# Patient Record
Sex: MALE | Race: WHITE | NOT HISPANIC OR LATINO | URBAN - METROPOLITAN AREA
[De-identification: names, ages, dates, MRNs, and addresses within clinical notes are randomized per-mention and may not be internally consistent; named-entity substitution may affect disease eponyms.]

---

## 2018-07-05 PROBLEM — Z00.00 ENCOUNTER FOR PREVENTIVE HEALTH EXAMINATION: Status: ACTIVE | Noted: 2018-07-05

## 2019-04-02 ENCOUNTER — EMERGENCY (EMERGENCY)
Facility: HOSPITAL | Age: 49
LOS: 1 days | Discharge: HOME | End: 2019-04-02
Attending: EMERGENCY MEDICINE | Admitting: EMERGENCY MEDICINE
Payer: COMMERCIAL

## 2019-04-02 VITALS
RESPIRATION RATE: 18 BRPM | OXYGEN SATURATION: 98 % | TEMPERATURE: 98 F | HEART RATE: 90 BPM | DIASTOLIC BLOOD PRESSURE: 80 MMHG | SYSTOLIC BLOOD PRESSURE: 135 MMHG

## 2019-04-02 VITALS
OXYGEN SATURATION: 96 % | HEIGHT: 67 IN | TEMPERATURE: 98 F | SYSTOLIC BLOOD PRESSURE: 150 MMHG | DIASTOLIC BLOOD PRESSURE: 88 MMHG | RESPIRATION RATE: 16 BRPM | WEIGHT: 250 LBS | HEART RATE: 94 BPM

## 2019-04-02 PROCEDURE — 99283 EMERGENCY DEPT VISIT LOW MDM: CPT

## 2019-04-02 PROCEDURE — 99284 EMERGENCY DEPT VISIT MOD MDM: CPT | Mod: 25

## 2019-04-02 RX ORDER — IBUPROFEN 200 MG
600 TABLET ORAL ONCE
Qty: 0 | Refills: 0 | Status: COMPLETED | OUTPATIENT
Start: 2019-04-02 | End: 2019-04-02

## 2019-04-02 RX ORDER — DIAZEPAM 5 MG
5 TABLET ORAL ONCE
Qty: 0 | Refills: 0 | Status: DISCONTINUED | OUTPATIENT
Start: 2019-04-02 | End: 2019-04-02

## 2019-04-02 RX ORDER — DIAZEPAM 5 MG
1 TABLET ORAL
Qty: 15 | Refills: 0
Start: 2019-04-02 | End: 2019-04-06

## 2019-04-02 RX ORDER — OXYCODONE AND ACETAMINOPHEN 5; 325 MG/1; MG/1
2 TABLET ORAL ONCE
Qty: 0 | Refills: 0 | Status: DISCONTINUED | OUTPATIENT
Start: 2019-04-02 | End: 2019-04-02

## 2019-04-02 RX ADMIN — Medication 600 MILLIGRAM(S): at 01:32

## 2019-04-02 RX ADMIN — OXYCODONE AND ACETAMINOPHEN 2 TABLET(S): 5; 325 TABLET ORAL at 01:32

## 2019-04-02 RX ADMIN — Medication 5 MILLIGRAM(S): at 01:32

## 2019-04-02 NOTE — ED ADULT NURSE NOTE - OBJECTIVE STATEMENT
Received pt awake alert and oriented x 3. DELEON. Pt presents to the ED C/O neck pain. Pt states he went to urgent care earlier and was given flexeril but didn't help.

## 2019-04-02 NOTE — ED ADULT TRIAGE NOTE - CHIEF COMPLAINT QUOTE
pain to the left side of the neck since saturday - now shooting down the left arm, numbness on the left hand starting from the shoulder to the left side of the neck /* 2 hours

## 2019-04-02 NOTE — ED PROVIDER NOTE - PROGRESS NOTE DETAILS
pt reevaluted, feeling better, pain has improved, pt has better range of motion with meds, pt to be d/c home with percocet and valium, follow up with pmd, return if any symtpoms wrosen

## 2019-04-02 NOTE — ED PROVIDER NOTE - OBJECTIVE STATEMENT
48yo male who presents with neck pain for 3 days. pt states he was lifting heavy weights and the next day felt a pull on the left side of his neck, sharp and shooting, no tingling or weakness, pain is worse with moving his neck, pt was seen at urgent care tonite and given flexeril and used tiger balm and heating pad with no improvement

## 2019-04-02 NOTE — ED ADULT NURSE NOTE - NSIMPLEMENTINTERV_GEN_ALL_ED
Implemented All Universal Safety Interventions:  [place] to call system. Call bell, personal items and telephone within reach. Instruct patient to call for assistance. Room bathroom lighting operational. Non-slip footwear when patient is off stretcher. Physically safe environment: no spills, clutter or unnecessary equipment. Stretcher in lowest position, wheels locked, appropriate side rails in place.

## 2019-04-10 PROBLEM — E03.9 HYPOTHYROIDISM, UNSPECIFIED: Chronic | Status: ACTIVE | Noted: 2019-04-02

## 2019-06-13 ENCOUNTER — OUTPATIENT (OUTPATIENT)
Dept: OUTPATIENT SERVICES | Facility: HOSPITAL | Age: 49
LOS: 1 days | End: 2019-06-13
Payer: COMMERCIAL

## 2019-06-13 VITALS
HEART RATE: 81 BPM | RESPIRATION RATE: 16 BRPM | DIASTOLIC BLOOD PRESSURE: 91 MMHG | TEMPERATURE: 98 F | OXYGEN SATURATION: 97 % | HEIGHT: 68 IN | SYSTOLIC BLOOD PRESSURE: 151 MMHG | WEIGHT: 259.93 LBS

## 2019-06-13 DIAGNOSIS — K40.30 UNILATERAL INGUINAL HERNIA, WITH OBSTRUCTION, WITHOUT GANGRENE, NOT SPECIFIED AS RECURRENT: ICD-10-CM

## 2019-06-13 DIAGNOSIS — Z01.818 ENCOUNTER FOR OTHER PREPROCEDURAL EXAMINATION: ICD-10-CM

## 2019-06-13 DIAGNOSIS — Z98.890 OTHER SPECIFIED POSTPROCEDURAL STATES: Chronic | ICD-10-CM

## 2019-06-13 LAB
ALBUMIN SERPL ELPH-MCNC: 3.9 G/DL — SIGNIFICANT CHANGE UP (ref 3.3–5)
ALP SERPL-CCNC: 76 U/L — SIGNIFICANT CHANGE UP (ref 40–120)
ALT FLD-CCNC: 64 U/L — SIGNIFICANT CHANGE UP (ref 12–78)
ANION GAP SERPL CALC-SCNC: 3 MMOL/L — LOW (ref 5–17)
AST SERPL-CCNC: 44 U/L — HIGH (ref 15–37)
BILIRUB SERPL-MCNC: 0.4 MG/DL — SIGNIFICANT CHANGE UP (ref 0.2–1.2)
BUN SERPL-MCNC: 21 MG/DL — SIGNIFICANT CHANGE UP (ref 7–23)
CALCIUM SERPL-MCNC: 9.1 MG/DL — SIGNIFICANT CHANGE UP (ref 8.5–10.1)
CHLORIDE SERPL-SCNC: 110 MMOL/L — HIGH (ref 96–108)
CO2 SERPL-SCNC: 27 MMOL/L — SIGNIFICANT CHANGE UP (ref 22–31)
CREAT SERPL-MCNC: 1.1 MG/DL — SIGNIFICANT CHANGE UP (ref 0.5–1.3)
GLUCOSE SERPL-MCNC: 113 MG/DL — HIGH (ref 70–99)
HCT VFR BLD CALC: 53.9 % — HIGH (ref 39–50)
HGB BLD-MCNC: 18.2 G/DL — HIGH (ref 13–17)
MCHC RBC-ENTMCNC: 29.6 PG — SIGNIFICANT CHANGE UP (ref 27–34)
MCHC RBC-ENTMCNC: 33.8 GM/DL — SIGNIFICANT CHANGE UP (ref 32–36)
MCV RBC AUTO: 87.8 FL — SIGNIFICANT CHANGE UP (ref 80–100)
NRBC # BLD: 0 /100 WBCS — SIGNIFICANT CHANGE UP (ref 0–0)
PLATELET # BLD AUTO: 238 K/UL — SIGNIFICANT CHANGE UP (ref 150–400)
POTASSIUM SERPL-MCNC: 4.5 MMOL/L — SIGNIFICANT CHANGE UP (ref 3.5–5.3)
POTASSIUM SERPL-SCNC: 4.5 MMOL/L — SIGNIFICANT CHANGE UP (ref 3.5–5.3)
PROT SERPL-MCNC: 7.6 G/DL — SIGNIFICANT CHANGE UP (ref 6–8.3)
RBC # BLD: 6.14 M/UL — HIGH (ref 4.2–5.8)
RBC # FLD: 12.6 % — SIGNIFICANT CHANGE UP (ref 10.3–14.5)
SODIUM SERPL-SCNC: 140 MMOL/L — SIGNIFICANT CHANGE UP (ref 135–145)
WBC # BLD: 8.61 K/UL — SIGNIFICANT CHANGE UP (ref 3.8–10.5)
WBC # FLD AUTO: 8.61 K/UL — SIGNIFICANT CHANGE UP (ref 3.8–10.5)

## 2019-06-13 PROCEDURE — 93005 ELECTROCARDIOGRAM TRACING: CPT

## 2019-06-13 PROCEDURE — 36415 COLL VENOUS BLD VENIPUNCTURE: CPT

## 2019-06-13 PROCEDURE — 93010 ELECTROCARDIOGRAM REPORT: CPT | Mod: NC

## 2019-06-13 PROCEDURE — 80053 COMPREHEN METABOLIC PANEL: CPT

## 2019-06-13 PROCEDURE — 85027 COMPLETE CBC AUTOMATED: CPT

## 2019-06-13 PROCEDURE — G0463: CPT

## 2019-06-13 RX ORDER — CYCLOBENZAPRINE HYDROCHLORIDE 10 MG/1
1 TABLET, FILM COATED ORAL
Qty: 0 | Refills: 0 | DISCHARGE

## 2019-06-13 RX ORDER — DEXTROAMPHETAMINE SACCHARATE, AMPHETAMINE ASPARTATE, DEXTROAMPHETAMINE SULFATE AND AMPHETAMINE SULFATE 1.875; 1.875; 1.875; 1.875 MG/1; MG/1; MG/1; MG/1
0 TABLET ORAL
Qty: 0 | Refills: 0 | DISCHARGE

## 2019-06-13 RX ORDER — ATORVASTATIN CALCIUM 80 MG/1
0 TABLET, FILM COATED ORAL
Qty: 0 | Refills: 0 | DISCHARGE

## 2019-06-13 NOTE — H&P PST ADULT - ASSESSMENT
48 yo male with a right inguinal hernia scheduled for a repair of right inguinal hernia on 6/21 with Dr. Sandoval

## 2019-06-13 NOTE — H&P PST ADULT - NSICDXPROBLEM_GEN_ALL_CORE_FT
PROBLEM DIAGNOSES  Problem: Unilateral inguinal hernia with obstruction and without gangrene, recurrence not specified  Assessment and Plan: scheduled for a repair of right inguinal hernia on 6/21 with Dr. Sandoval      Problem: Pre-op evaluation  Assessment and Plan: Labs - CBC, CMP and EKG  No MC requested by Dr. Sandoval  Pre op and Hibiclens instructions reviewed and given. Take routine am meds DOS with sip of water. Avoid NSAIDs and OTC supplements. Verbalized understanding

## 2019-06-13 NOTE — H&P PST ADULT - HISTORY OF PRESENT ILLNESS
48 yo male with ADD, HLD and hypothyroidism presents to [facility] scheduled for a repair of right inguinal hernia on 6/21 with Dr. Sandoval. Reports right groin pain twice in the past 6 months with bulging. Denies urinary symptomology

## 2019-06-13 NOTE — H&P PST ADULT - NSANTHOSAYNRD_GEN_A_CORE
No. AUGUST screening performed.  STOP BANG Legend: 0-2 = LOW Risk; 3-4 = INTERMEDIATE Risk; 5-8 = HIGH Risk/Neg study years ago

## 2019-06-13 NOTE — H&P PST ADULT - ATTEMPT TO OOB
Face to face report given with opportunity to observe patient.  Report given to MACIE Hood.    Salome Knapp  1/19/2019, 11:34 PM         no

## 2019-06-20 ENCOUNTER — TRANSCRIPTION ENCOUNTER (OUTPATIENT)
Age: 49
End: 2019-06-20

## 2019-06-21 ENCOUNTER — OUTPATIENT (OUTPATIENT)
Dept: OUTPATIENT SERVICES | Facility: HOSPITAL | Age: 49
LOS: 1 days | End: 2019-06-21
Payer: COMMERCIAL

## 2019-06-21 ENCOUNTER — RESULT REVIEW (OUTPATIENT)
Age: 49
End: 2019-06-21

## 2019-06-21 VITALS
TEMPERATURE: 99 F | RESPIRATION RATE: 14 BRPM | SYSTOLIC BLOOD PRESSURE: 150 MMHG | WEIGHT: 259.93 LBS | HEART RATE: 82 BPM | OXYGEN SATURATION: 97 % | DIASTOLIC BLOOD PRESSURE: 82 MMHG | HEIGHT: 68 IN

## 2019-06-21 VITALS
RESPIRATION RATE: 15 BRPM | OXYGEN SATURATION: 96 % | SYSTOLIC BLOOD PRESSURE: 136 MMHG | HEART RATE: 92 BPM | DIASTOLIC BLOOD PRESSURE: 71 MMHG

## 2019-06-21 DIAGNOSIS — Z98.890 OTHER SPECIFIED POSTPROCEDURAL STATES: Chronic | ICD-10-CM

## 2019-06-21 DIAGNOSIS — K40.30 UNILATERAL INGUINAL HERNIA, WITH OBSTRUCTION, WITHOUT GANGRENE, NOT SPECIFIED AS RECURRENT: ICD-10-CM

## 2019-06-21 PROCEDURE — C1781: CPT

## 2019-06-21 PROCEDURE — 49505 PRP I/HERN INIT REDUC >5 YR: CPT | Mod: RT

## 2019-06-21 PROCEDURE — 88304 TISSUE EXAM BY PATHOLOGIST: CPT

## 2019-06-21 PROCEDURE — 55520 REMOVAL OF SPERM CORD LESION: CPT | Mod: XS

## 2019-06-21 PROCEDURE — 88304 TISSUE EXAM BY PATHOLOGIST: CPT | Mod: 26

## 2019-06-21 RX ORDER — OXYCODONE HYDROCHLORIDE 5 MG/1
5 TABLET ORAL ONCE
Refills: 0 | Status: DISCONTINUED | OUTPATIENT
Start: 2019-06-21 | End: 2019-06-21

## 2019-06-21 RX ORDER — SODIUM CHLORIDE 9 MG/ML
1000 INJECTION, SOLUTION INTRAVENOUS
Refills: 0 | Status: DISCONTINUED | OUTPATIENT
Start: 2019-06-21 | End: 2019-06-21

## 2019-06-21 RX ORDER — METOCLOPRAMIDE HCL 10 MG
5 TABLET ORAL ONCE
Refills: 0 | Status: DISCONTINUED | OUTPATIENT
Start: 2019-06-21 | End: 2019-06-21

## 2019-06-21 RX ORDER — CEFAZOLIN SODIUM 1 G
1000 VIAL (EA) INJECTION ONCE
Refills: 0 | Status: COMPLETED | OUTPATIENT
Start: 2019-06-21 | End: 2019-06-21

## 2019-06-21 RX ORDER — SODIUM CHLORIDE 9 MG/ML
3 INJECTION INTRAMUSCULAR; INTRAVENOUS; SUBCUTANEOUS ONCE
Refills: 0 | Status: DISCONTINUED | OUTPATIENT
Start: 2019-06-21 | End: 2019-06-21

## 2019-06-21 RX ORDER — HYDROMORPHONE HYDROCHLORIDE 2 MG/ML
0.5 INJECTION INTRAMUSCULAR; INTRAVENOUS; SUBCUTANEOUS
Refills: 0 | Status: DISCONTINUED | OUTPATIENT
Start: 2019-06-21 | End: 2019-06-21

## 2019-06-21 RX ADMIN — SODIUM CHLORIDE 125 MILLILITER(S): 9 INJECTION, SOLUTION INTRAVENOUS at 11:55

## 2019-06-21 RX ADMIN — SODIUM CHLORIDE 30 MILLILITER(S): 9 INJECTION, SOLUTION INTRAVENOUS at 09:10

## 2019-06-21 NOTE — ASU PATIENT PROFILE, ADULT - PMH
Attention deficit disorder    HLD (hyperlipidemia)    Hypothyroidism    Unilateral inguinal hernia with obstruction and without gangrene, recurrence not specified

## 2019-06-21 NOTE — ASU DISCHARGE PLAN (ADULT/PEDIATRIC) - CARE PROVIDER_API CALL
Navin Sandoval ()  Surgery  Navin Sandoval [address] , [place] [address]  Phone: [phone]  Fax: [phone]  Follow Up Time:

## 2019-06-21 NOTE — ASU DISCHARGE PLAN (ADULT/PEDIATRIC) - ASU DC SPECIAL INSTRUCTIONSFT
You may shower in 24 hours.  Do not remove steri-strips.  Do not let water hit wound directly, do not rub incision.      No heavy lifting (nothing heavier than 5 lbs.), no strenuous physical activity, no exercise.      You may perform your usual daily tasks as tolerated.      Take medications as prescribed for pain.  Take Milk of Magnesia (30cc twice a day) while taking narcotic pain medications.     Follow-up with Dr. Sandoval in his office as per office instructions discussed during your pre-operative office consultation.

## 2019-06-24 LAB — SURGICAL PATHOLOGY STUDY: SIGNIFICANT CHANGE UP

## 2019-12-18 PROBLEM — F98.8 OTHER SPECIFIED BEHAVIORAL AND EMOTIONAL DISORDERS WITH ONSET USUALLY OCCURRING IN CHILDHOOD AND ADOLESCENCE: Chronic | Status: ACTIVE | Noted: 2019-06-13

## 2019-12-18 PROBLEM — K40.30 UNILATERAL INGUINAL HERNIA, WITH OBSTRUCTION, WITHOUT GANGRENE, NOT SPECIFIED AS RECURRENT: Chronic | Status: ACTIVE | Noted: 2019-06-13

## 2019-12-18 PROBLEM — E78.5 HYPERLIPIDEMIA, UNSPECIFIED: Chronic | Status: ACTIVE | Noted: 2019-06-13

## 2019-12-24 ENCOUNTER — OUTPATIENT (OUTPATIENT)
Dept: OUTPATIENT SERVICES | Facility: HOSPITAL | Age: 49
LOS: 1 days | End: 2019-12-24
Payer: COMMERCIAL

## 2019-12-24 VITALS
SYSTOLIC BLOOD PRESSURE: 161 MMHG | OXYGEN SATURATION: 97 % | DIASTOLIC BLOOD PRESSURE: 87 MMHG | TEMPERATURE: 98 F | RESPIRATION RATE: 16 BRPM | HEIGHT: 67 IN | HEART RATE: 74 BPM | WEIGHT: 266.98 LBS

## 2019-12-24 DIAGNOSIS — Z01.818 ENCOUNTER FOR OTHER PREPROCEDURAL EXAMINATION: ICD-10-CM

## 2019-12-24 DIAGNOSIS — Z98.890 OTHER SPECIFIED POSTPROCEDURAL STATES: Chronic | ICD-10-CM

## 2019-12-24 DIAGNOSIS — M17.12 UNILATERAL PRIMARY OSTEOARTHRITIS, LEFT KNEE: ICD-10-CM

## 2019-12-24 LAB
ALBUMIN SERPL ELPH-MCNC: 3.5 G/DL — SIGNIFICANT CHANGE UP (ref 3.3–5)
ALP SERPL-CCNC: 66 U/L — SIGNIFICANT CHANGE UP (ref 40–120)
ALT FLD-CCNC: 53 U/L — SIGNIFICANT CHANGE UP (ref 12–78)
ANION GAP SERPL CALC-SCNC: 6 MMOL/L — SIGNIFICANT CHANGE UP (ref 5–17)
APTT BLD: 29.8 SEC — SIGNIFICANT CHANGE UP (ref 28.5–37)
AST SERPL-CCNC: 31 U/L — SIGNIFICANT CHANGE UP (ref 15–37)
BILIRUB SERPL-MCNC: 0.6 MG/DL — SIGNIFICANT CHANGE UP (ref 0.2–1.2)
BUN SERPL-MCNC: 17 MG/DL — SIGNIFICANT CHANGE UP (ref 7–23)
CALCIUM SERPL-MCNC: 8.5 MG/DL — SIGNIFICANT CHANGE UP (ref 8.5–10.1)
CHLORIDE SERPL-SCNC: 111 MMOL/L — HIGH (ref 96–108)
CO2 SERPL-SCNC: 26 MMOL/L — SIGNIFICANT CHANGE UP (ref 22–31)
CREAT SERPL-MCNC: 1.2 MG/DL — SIGNIFICANT CHANGE UP (ref 0.5–1.3)
GLUCOSE SERPL-MCNC: 98 MG/DL — SIGNIFICANT CHANGE UP (ref 70–99)
HBA1C BLD-MCNC: 5.5 % — SIGNIFICANT CHANGE UP (ref 4–5.6)
HCT VFR BLD CALC: 44.9 % — SIGNIFICANT CHANGE UP (ref 39–50)
HGB BLD-MCNC: 15.1 G/DL — SIGNIFICANT CHANGE UP (ref 13–17)
INR BLD: 1 RATIO — SIGNIFICANT CHANGE UP (ref 0.88–1.16)
MCHC RBC-ENTMCNC: 30 PG — SIGNIFICANT CHANGE UP (ref 27–34)
MCHC RBC-ENTMCNC: 33.6 GM/DL — SIGNIFICANT CHANGE UP (ref 32–36)
MCV RBC AUTO: 89.1 FL — SIGNIFICANT CHANGE UP (ref 80–100)
NRBC # BLD: 0 /100 WBCS — SIGNIFICANT CHANGE UP (ref 0–0)
PLATELET # BLD AUTO: 259 K/UL — SIGNIFICANT CHANGE UP (ref 150–400)
POTASSIUM SERPL-MCNC: 3.9 MMOL/L — SIGNIFICANT CHANGE UP (ref 3.5–5.3)
POTASSIUM SERPL-SCNC: 3.9 MMOL/L — SIGNIFICANT CHANGE UP (ref 3.5–5.3)
PROT SERPL-MCNC: 6.6 G/DL — SIGNIFICANT CHANGE UP (ref 6–8.3)
PROTHROM AB SERPL-ACNC: 11.3 SEC — SIGNIFICANT CHANGE UP (ref 10–12.9)
RBC # BLD: 5.04 M/UL — SIGNIFICANT CHANGE UP (ref 4.2–5.8)
RBC # FLD: 13.4 % — SIGNIFICANT CHANGE UP (ref 10.3–14.5)
SODIUM SERPL-SCNC: 143 MMOL/L — SIGNIFICANT CHANGE UP (ref 135–145)
WBC # BLD: 10.86 K/UL — HIGH (ref 3.8–10.5)
WBC # FLD AUTO: 10.86 K/UL — HIGH (ref 3.8–10.5)

## 2019-12-24 PROCEDURE — 93010 ELECTROCARDIOGRAM REPORT: CPT

## 2019-12-24 PROCEDURE — 86900 BLOOD TYPING SEROLOGIC ABO: CPT

## 2019-12-24 PROCEDURE — 86901 BLOOD TYPING SEROLOGIC RH(D): CPT

## 2019-12-24 PROCEDURE — 85730 THROMBOPLASTIN TIME PARTIAL: CPT

## 2019-12-24 PROCEDURE — 80053 COMPREHEN METABOLIC PANEL: CPT

## 2019-12-24 PROCEDURE — 36415 COLL VENOUS BLD VENIPUNCTURE: CPT

## 2019-12-24 PROCEDURE — 93005 ELECTROCARDIOGRAM TRACING: CPT

## 2019-12-24 PROCEDURE — 87640 STAPH A DNA AMP PROBE: CPT

## 2019-12-24 PROCEDURE — 87641 MR-STAPH DNA AMP PROBE: CPT

## 2019-12-24 PROCEDURE — 73560 X-RAY EXAM OF KNEE 1 OR 2: CPT

## 2019-12-24 PROCEDURE — G0463: CPT

## 2019-12-24 PROCEDURE — 85610 PROTHROMBIN TIME: CPT

## 2019-12-24 PROCEDURE — 86850 RBC ANTIBODY SCREEN: CPT

## 2019-12-24 PROCEDURE — 85027 COMPLETE CBC AUTOMATED: CPT

## 2019-12-24 PROCEDURE — 83036 HEMOGLOBIN GLYCOSYLATED A1C: CPT

## 2019-12-24 PROCEDURE — 73560 X-RAY EXAM OF KNEE 1 OR 2: CPT | Mod: 26,50,59

## 2019-12-24 RX ORDER — LISINOPRIL 2.5 MG/1
0 TABLET ORAL
Qty: 30 | Refills: 0 | DISCHARGE

## 2019-12-24 RX ORDER — MELOXICAM 15 MG/1
0 TABLET ORAL
Qty: 30 | Refills: 0 | DISCHARGE

## 2019-12-24 NOTE — H&P PST ADULT - ASSESSMENT
50 yo male with OA of the left knee scheduled for a left total knee replacement on 1/6/2020 with Dr. Partida

## 2019-12-24 NOTE — H&P PST ADULT - HISTORY OF PRESENT ILLNESS
Influenza Vaccination 50 yo male with HTN, ADD, HLD and hypothyroidism presents to PST scheduled for a left total knee replacement on 1/6/2020 with Dr. Partida. Reports bilateral knee pain secondary to bone on bone OA. Left knee hurts more then the right. Doing the left knee first then doing the right knee one week later. Rivaroxaban/Xarelto/Influenza Vaccination

## 2019-12-24 NOTE — H&P PST ADULT - NSICDXPROBLEM_GEN_ALL_CORE_FT
PROBLEM DIAGNOSES  Problem: Unilateral primary osteoarthritis, left knee  Assessment and Plan: scheduled for a left total knee replacement on 1/6/2020 with Dr. Partida    Problem: Pre-op evaluation  Assessment and Plan: Labs-CBC, CMP, PT/PTT, T&S, A1c, Nose cx, and EKG, X-ray  MC   Pre op and 3 day of Hibiclens instructions reviewed and given. Take routine am meds DOS with sip of water. Avoid NSAIDs and OTC supplements. Verbalized understanding PROBLEM DIAGNOSES  Problem: Unilateral primary osteoarthritis, left knee  Assessment and Plan: scheduled for a left total knee replacement on 1/6/2020 with Dr. Partida    Problem: Pre-op evaluation  Assessment and Plan: Labs-CBC, CMP, PT/PTT, T&S, A1c, Nose cx, and EKG, bilateral knee x-ray  MC with Dr. Zavala  Pre op and 3 day of Hibiclens instructions reviewed and given. Take routine am meds DOS with sip of water. Avoid NSAIDs and OTC supplements. Verbalized understanding

## 2019-12-24 NOTE — H&P PST ADULT - NSICDXPASTMEDICALHX_GEN_ALL_CORE_FT
PAST MEDICAL HISTORY:  Attention deficit disorder     HLD (hyperlipidemia)     HTN (hypertension)     Hypothyroidism     Unilateral inguinal hernia with obstruction and without gangrene, recurrence not specified     Unilateral primary osteoarthritis, left knee     Unilateral primary osteoarthritis, unspecified knee

## 2019-12-25 LAB
MRSA PCR RESULT.: SIGNIFICANT CHANGE UP
S AUREUS DNA NOSE QL NAA+PROBE: SIGNIFICANT CHANGE UP

## 2020-01-03 RX ORDER — TRAMADOL HYDROCHLORIDE 50 MG/1
50 TABLET ORAL EVERY 4 HOURS
Refills: 0 | Status: DISCONTINUED | OUTPATIENT
Start: 2020-01-06 | End: 2020-01-13

## 2020-01-03 RX ORDER — HYDROMORPHONE HYDROCHLORIDE 2 MG/ML
2 INJECTION INTRAMUSCULAR; INTRAVENOUS; SUBCUTANEOUS EVERY 4 HOURS
Refills: 0 | Status: DISCONTINUED | OUTPATIENT
Start: 2020-01-06 | End: 2020-01-09

## 2020-01-03 RX ORDER — ACETAMINOPHEN 500 MG
1000 TABLET ORAL EVERY 6 HOURS
Refills: 0 | Status: DISCONTINUED | OUTPATIENT
Start: 2020-01-06 | End: 2020-01-16

## 2020-01-03 RX ORDER — SODIUM CHLORIDE 9 MG/ML
1000 INJECTION, SOLUTION INTRAVENOUS
Refills: 0 | Status: DISCONTINUED | OUTPATIENT
Start: 2020-01-06 | End: 2020-01-07

## 2020-01-03 RX ORDER — PANTOPRAZOLE SODIUM 20 MG/1
40 TABLET, DELAYED RELEASE ORAL
Refills: 0 | Status: DISCONTINUED | OUTPATIENT
Start: 2020-01-06 | End: 2020-01-13

## 2020-01-03 RX ORDER — ONDANSETRON 8 MG/1
4 TABLET, FILM COATED ORAL EVERY 6 HOURS
Refills: 0 | Status: DISCONTINUED | OUTPATIENT
Start: 2020-01-06 | End: 2020-01-13

## 2020-01-03 RX ORDER — ATORVASTATIN CALCIUM 80 MG/1
40 TABLET, FILM COATED ORAL AT BEDTIME
Refills: 0 | Status: DISCONTINUED | OUTPATIENT
Start: 2020-01-06 | End: 2020-01-13

## 2020-01-03 RX ORDER — FERROUS SULFATE 325(65) MG
325 TABLET ORAL
Refills: 0 | Status: DISCONTINUED | OUTPATIENT
Start: 2020-01-06 | End: 2020-01-13

## 2020-01-03 RX ORDER — FOLIC ACID 0.8 MG
1 TABLET ORAL DAILY
Refills: 0 | Status: DISCONTINUED | OUTPATIENT
Start: 2020-01-06 | End: 2020-01-13

## 2020-01-03 RX ORDER — CELECOXIB 200 MG/1
200 CAPSULE ORAL
Refills: 0 | Status: DISCONTINUED | OUTPATIENT
Start: 2020-01-06 | End: 2020-01-13

## 2020-01-03 RX ORDER — LISINOPRIL 2.5 MG/1
20 TABLET ORAL DAILY
Refills: 0 | Status: DISCONTINUED | OUTPATIENT
Start: 2020-01-06 | End: 2020-01-13

## 2020-01-03 RX ORDER — LEVOTHYROXINE SODIUM 125 MCG
175 TABLET ORAL DAILY
Refills: 0 | Status: DISCONTINUED | OUTPATIENT
Start: 2020-01-06 | End: 2020-01-13

## 2020-01-03 RX ORDER — DEXTROAMPHETAMINE SACCHARATE, AMPHETAMINE ASPARTATE, DEXTROAMPHETAMINE SULFATE AND AMPHETAMINE SULFATE 1.875; 1.875; 1.875; 1.875 MG/1; MG/1; MG/1; MG/1
20 TABLET ORAL DAILY
Refills: 0 | Status: DISCONTINUED | OUTPATIENT
Start: 2020-01-06 | End: 2020-01-13

## 2020-01-03 RX ORDER — TRAMADOL HYDROCHLORIDE 50 MG/1
100 TABLET ORAL EVERY 8 HOURS
Refills: 0 | Status: DISCONTINUED | OUTPATIENT
Start: 2020-01-06 | End: 2020-01-07

## 2020-01-03 RX ORDER — ENOXAPARIN SODIUM 100 MG/ML
40 INJECTION SUBCUTANEOUS DAILY
Refills: 0 | Status: COMPLETED | OUTPATIENT
Start: 2020-01-07 | End: 2020-01-12

## 2020-01-03 RX ORDER — ASCORBIC ACID 60 MG
500 TABLET,CHEWABLE ORAL
Refills: 0 | Status: DISCONTINUED | OUTPATIENT
Start: 2020-01-06 | End: 2020-01-13

## 2020-01-03 RX ORDER — SENNA PLUS 8.6 MG/1
2 TABLET ORAL AT BEDTIME
Refills: 0 | Status: DISCONTINUED | OUTPATIENT
Start: 2020-01-06 | End: 2020-01-13

## 2020-01-05 ENCOUNTER — TRANSCRIPTION ENCOUNTER (OUTPATIENT)
Age: 50
End: 2020-01-05

## 2020-01-06 ENCOUNTER — TRANSCRIPTION ENCOUNTER (OUTPATIENT)
Age: 50
End: 2020-01-06

## 2020-01-06 ENCOUNTER — RESULT REVIEW (OUTPATIENT)
Age: 50
End: 2020-01-06

## 2020-01-06 ENCOUNTER — INPATIENT (INPATIENT)
Facility: HOSPITAL | Age: 50
LOS: 9 days | Discharge: HOME | DRG: 462 | End: 2020-01-16
Attending: ORTHOPAEDIC SURGERY | Admitting: ORTHOPAEDIC SURGERY
Payer: COMMERCIAL

## 2020-01-06 VITALS
HEART RATE: 82 BPM | WEIGHT: 261.91 LBS | OXYGEN SATURATION: 96 % | DIASTOLIC BLOOD PRESSURE: 75 MMHG | RESPIRATION RATE: 18 BRPM | TEMPERATURE: 99 F | HEIGHT: 67.01 IN | SYSTOLIC BLOOD PRESSURE: 137 MMHG

## 2020-01-06 DIAGNOSIS — E78.5 HYPERLIPIDEMIA, UNSPECIFIED: ICD-10-CM

## 2020-01-06 DIAGNOSIS — I10 ESSENTIAL (PRIMARY) HYPERTENSION: ICD-10-CM

## 2020-01-06 DIAGNOSIS — Z98.890 OTHER SPECIFIED POSTPROCEDURAL STATES: Chronic | ICD-10-CM

## 2020-01-06 DIAGNOSIS — M17.12 UNILATERAL PRIMARY OSTEOARTHRITIS, LEFT KNEE: ICD-10-CM

## 2020-01-06 DIAGNOSIS — E03.9 HYPOTHYROIDISM, UNSPECIFIED: ICD-10-CM

## 2020-01-06 LAB
ABO RH CONFIRMATION: SIGNIFICANT CHANGE UP
HCT VFR BLD CALC: 45.8 % — SIGNIFICANT CHANGE UP (ref 39–50)
HGB BLD-MCNC: 15.4 G/DL — SIGNIFICANT CHANGE UP (ref 13–17)
MCHC RBC-ENTMCNC: 30.1 PG — SIGNIFICANT CHANGE UP (ref 27–34)
MCHC RBC-ENTMCNC: 33.6 GM/DL — SIGNIFICANT CHANGE UP (ref 32–36)
MCV RBC AUTO: 89.6 FL — SIGNIFICANT CHANGE UP (ref 80–100)
NRBC # BLD: 0 /100 WBCS — SIGNIFICANT CHANGE UP (ref 0–0)
PLATELET # BLD AUTO: 221 K/UL — SIGNIFICANT CHANGE UP (ref 150–400)
RBC # BLD: 5.11 M/UL — SIGNIFICANT CHANGE UP (ref 4.2–5.8)
RBC # FLD: 12.9 % — SIGNIFICANT CHANGE UP (ref 10.3–14.5)
WBC # BLD: 14.28 K/UL — HIGH (ref 3.8–10.5)
WBC # FLD AUTO: 14.28 K/UL — HIGH (ref 3.8–10.5)

## 2020-01-06 PROCEDURE — 73562 X-RAY EXAM OF KNEE 3: CPT | Mod: 26,LT

## 2020-01-06 PROCEDURE — 88311 DECALCIFY TISSUE: CPT | Mod: 26

## 2020-01-06 PROCEDURE — 88305 TISSUE EXAM BY PATHOLOGIST: CPT | Mod: 26

## 2020-01-06 RX ORDER — ONDANSETRON 8 MG/1
4 TABLET, FILM COATED ORAL ONCE
Refills: 0 | Status: DISCONTINUED | OUTPATIENT
Start: 2020-01-06 | End: 2020-01-13

## 2020-01-06 RX ORDER — CEFAZOLIN SODIUM 1 G
3000 VIAL (EA) INJECTION EVERY 8 HOURS
Refills: 0 | Status: COMPLETED | OUTPATIENT
Start: 2020-01-06 | End: 2020-01-06

## 2020-01-06 RX ORDER — BENZOCAINE AND MENTHOL 5; 1 G/100ML; G/100ML
1 LIQUID ORAL EVERY 4 HOURS
Refills: 0 | Status: DISCONTINUED | OUTPATIENT
Start: 2020-01-06 | End: 2020-01-13

## 2020-01-06 RX ORDER — ENOXAPARIN SODIUM 100 MG/ML
40 INJECTION SUBCUTANEOUS
Qty: 1 | Refills: 0
Start: 2020-01-06 | End: 2020-02-04

## 2020-01-06 RX ORDER — SODIUM CHLORIDE 9 MG/ML
1000 INJECTION, SOLUTION INTRAVENOUS
Refills: 0 | Status: DISCONTINUED | OUTPATIENT
Start: 2020-01-06 | End: 2020-01-06

## 2020-01-06 RX ORDER — ACETAMINOPHEN 500 MG
1000 TABLET ORAL ONCE
Refills: 0 | Status: COMPLETED | OUTPATIENT
Start: 2020-01-06 | End: 2020-01-06

## 2020-01-06 RX ORDER — ACETAMINOPHEN 500 MG
1000 TABLET ORAL ONCE
Refills: 0 | Status: COMPLETED | OUTPATIENT
Start: 2020-01-07 | End: 2020-01-07

## 2020-01-06 RX ORDER — BUPIVACAINE 13.3 MG/ML
20 INJECTION, SUSPENSION, LIPOSOMAL INFILTRATION ONCE
Refills: 0 | Status: DISCONTINUED | OUTPATIENT
Start: 2020-01-06 | End: 2020-01-06

## 2020-01-06 RX ORDER — CELECOXIB 200 MG/1
1 CAPSULE ORAL
Qty: 0 | Refills: 0 | DISCHARGE
Start: 2020-01-06

## 2020-01-06 RX ORDER — BENZOCAINE 10 %
1 GEL (GRAM) MUCOUS MEMBRANE ONCE
Refills: 0 | Status: COMPLETED | OUTPATIENT
Start: 2020-01-06 | End: 2020-01-06

## 2020-01-06 RX ORDER — CEFAZOLIN SODIUM 1 G
3000 VIAL (EA) INJECTION ONCE
Refills: 0 | Status: COMPLETED | OUTPATIENT
Start: 2020-01-06 | End: 2020-01-06

## 2020-01-06 RX ORDER — MELOXICAM 15 MG/1
1 TABLET ORAL
Qty: 0 | Refills: 0 | DISCHARGE

## 2020-01-06 RX ORDER — HYDROMORPHONE HYDROCHLORIDE 2 MG/ML
0.5 INJECTION INTRAMUSCULAR; INTRAVENOUS; SUBCUTANEOUS
Refills: 0 | Status: DISCONTINUED | OUTPATIENT
Start: 2020-01-06 | End: 2020-01-06

## 2020-01-06 RX ORDER — TRAMADOL HYDROCHLORIDE 50 MG/1
1 TABLET ORAL
Qty: 42 | Refills: 0
Start: 2020-01-06 | End: 2020-01-12

## 2020-01-06 RX ADMIN — Medication 400 MILLIGRAM(S): at 09:59

## 2020-01-06 RX ADMIN — HYDROMORPHONE HYDROCHLORIDE 0.5 MILLIGRAM(S): 2 INJECTION INTRAMUSCULAR; INTRAVENOUS; SUBCUTANEOUS at 10:31

## 2020-01-06 RX ADMIN — BENZOCAINE AND MENTHOL 1 LOZENGE: 5; 1 LIQUID ORAL at 18:13

## 2020-01-06 RX ADMIN — Medication 200 MILLIGRAM(S): at 16:05

## 2020-01-06 RX ADMIN — ATORVASTATIN CALCIUM 40 MILLIGRAM(S): 80 TABLET, FILM COATED ORAL at 21:04

## 2020-01-06 RX ADMIN — HYDROMORPHONE HYDROCHLORIDE 2 MILLIGRAM(S): 2 INJECTION INTRAMUSCULAR; INTRAVENOUS; SUBCUTANEOUS at 14:18

## 2020-01-06 RX ADMIN — CELECOXIB 200 MILLIGRAM(S): 200 CAPSULE ORAL at 17:55

## 2020-01-06 RX ADMIN — Medication 1000 MILLIGRAM(S): at 10:28

## 2020-01-06 RX ADMIN — SODIUM CHLORIDE 75 MILLILITER(S): 9 INJECTION, SOLUTION INTRAVENOUS at 07:21

## 2020-01-06 RX ADMIN — HYDROMORPHONE HYDROCHLORIDE 2 MILLIGRAM(S): 2 INJECTION INTRAMUSCULAR; INTRAVENOUS; SUBCUTANEOUS at 17:55

## 2020-01-06 RX ADMIN — HYDROMORPHONE HYDROCHLORIDE 2 MILLIGRAM(S): 2 INJECTION INTRAMUSCULAR; INTRAVENOUS; SUBCUTANEOUS at 13:18

## 2020-01-06 RX ADMIN — SODIUM CHLORIDE 50 MILLILITER(S): 9 INJECTION, SOLUTION INTRAVENOUS at 09:59

## 2020-01-06 RX ADMIN — Medication 400 MILLIGRAM(S): at 17:54

## 2020-01-06 RX ADMIN — TRAMADOL HYDROCHLORIDE 100 MILLIGRAM(S): 50 TABLET ORAL at 23:40

## 2020-01-06 RX ADMIN — Medication 200 MILLIGRAM(S): at 23:41

## 2020-01-06 NOTE — PATIENT PROFILE ADULT - PRIMARY ROLES/RESPONSIBILITIES
Received fax from pt mom requesting asthma action plan form completed for pt school. Mom would like form to be mailed to home.   Last px 2-26-18 MTH  [facility] OF THE [facility] retired

## 2020-01-06 NOTE — PHYSICAL THERAPY INITIAL EVALUATION ADULT - ADDITIONAL COMMENTS
Pt reports that he lives in a two-family home. He and his significant other (Ena, present at bedside) live on the top floor. Pt's two sons live on the ground level. He reports 2 [address] with railings, and one flight of stairs to his apartment with no railings. There is a bedroom, bathroom, and kitchen upstairs. He states that he can stay with his sons if he has to, but would prefer to be able to get up to his own apartment. He states that he was independent in dressing and bathing, and does not own any assistive device. He has a walk-in shower with a grab bar as well as a bathtub. He wants to be able to go home after his second scheduled surgery next week (R TKR on 1/13/2020).

## 2020-01-06 NOTE — DISCHARGE NOTE PROVIDER - NSDCFUADDINST_GEN_ALL_CORE_FT
WEIGHT BEARING AS TOLERATED.  XARELTO 10 MG DAILY FOR TOTAL OF 35 DAYS AS OF 01/14/2020.  FOLLOW UP WITH DR. PUENTE IN 2 WEEKS  CALL [phone] FOR AN APPOINTMENT.  KEEP KNEE AQUACEL  DRESSING  ON DRY AND CLEAN, IT WILL BE CHANGED OR REMOVED ON YOUR NEXT OFFICE VISIT.

## 2020-01-06 NOTE — DISCHARGE NOTE PROVIDER - NSDCMRMEDTOKEN_GEN_ALL_CORE_FT
Adderall 20 mg oral tablet: 1 tab(s) orally once a day  atorvastatin 40 mg oral tablet: 1 tab(s) orally once a day (in the evening)  celecoxib 200 mg oral capsule: 1 cap(s) orally 2 times a day  enoxaparin 40 mg/0.4 mL injectable solution: 40 milligram(s) subcutaneously once a day   Do not Skip doses. Take for 14 days  levothyroxine 175 mcg (0.175 mg) oral tablet: 1 tab(s) orally once a day  LISINOPRIL   TAB 20MG: orally once a day (in the evening)  traMADol 50 mg oral tablet: 1 tab(s) orally every 4 to 6 hours MDD:6 Adderall 20 mg oral tablet: 1 tab(s) orally once a day  atorvastatin 40 mg oral tablet: 1 tab(s) orally once a day (in the evening)  celecoxib 200 mg oral capsule: 1 cap(s) orally 2 times a day  levothyroxine 175 mcg (0.175 mg) oral tablet: 1 tab(s) orally once a day  LISINOPRIL   TAB 20MG: orally once a day (in the evening)  traMADol 50 mg oral tablet: 1 tab(s) orally every 4 to 6 hours MDD:6  Xarelto 10 mg oral tablet: 1 tab(s) orally once a day Adderall 20 mg oral tablet: 1 tab(s) orally once a day  atorvastatin 40 mg oral tablet: 1 tab(s) orally once a day (in the evening)  celecoxib 200 mg oral capsule: 1 cap(s) orally 2 times a day  celecoxib 200 mg oral capsule: 1 cap(s) orally 2 times a day  Colace 100 mg oral capsule: 1 cap(s) orally 2 times a day   ferrous sulfate 325 mg (65 mg elemental iron) oral tablet: 1 tab(s) orally 2 times a day   levothyroxine 175 mcg (0.175 mg) oral tablet: 1 tab(s) orally once a day  LISINOPRIL   TAB 20MG: orally once a day (in the evening)  rivaroxaban 10 mg oral tablet: 1 tab(s) orally once a day  traMADol 50 mg oral tablet: 1 tab(s) orally every 6 hours, As Needed -for severe pain MDD:4  traMADol 50 mg oral tablet: 1 tab(s) orally every 4 to 6 hours MDD:6  Xarelto 10 mg oral tablet: 1 tab(s) orally once a day atorvastatin 40 mg oral tablet: 1 tab(s) orally once a day (at bedtime)  celecoxib 200 mg oral capsule: 1 cap(s) orally 2 times a day  celecoxib 200 mg oral capsule: 1 cap(s) orally 2 times a day  Colace 100 mg oral capsule: 1 cap(s) orally 2 times a day   dextroamphetamine-amphetamine 20 mg oral tablet: 1 tab(s) orally once a day  ferrous sulfate 325 mg (65 mg elemental iron) oral tablet: 1 tab(s) orally 2 times a day   levothyroxine 175 mcg (0.175 mg) oral tablet: 1 tab(s) orally once a day  lisinopril 20 mg oral tablet: 1 tab(s) orally once a day  rivaroxaban 10 mg oral tablet: 1 tab(s) orally once a day  traMADol 50 mg oral tablet: 1 tab(s) orally every 6 hours, As Needed -for severe pain MDD:4  traMADol 50 mg oral tablet: 1 tab(s) orally every 4 to 6 hours MDD:6  Xarelto 10 mg oral tablet: 1 tab(s) orally once a day

## 2020-01-06 NOTE — DISCHARGE NOTE PROVIDER - CARE PROVIDER_API CALL
Steven Partida)  Orthopaedic Surgery  [address] [place] [address]  Phone: [phone]  Fax: [phone]  Follow Up Time: 2 weeks

## 2020-01-06 NOTE — PHYSICAL THERAPY INITIAL EVALUATION ADULT - GAIT DEVIATIONS NOTED, PT EVAL
decreased jessy/increased time in double stance/decreased velocity of limb motion/decreased step length/decreased stride length/decreased weight-shifting ability

## 2020-01-06 NOTE — DISCHARGE NOTE PROVIDER - REASON FOR ADMISSION
left total knee arthroplasty BILATERAL KNEE END STAGE OSTEOARTHRITIS  STAGED BILATERAL TOTAL KNEE REPLACEMENT

## 2020-01-06 NOTE — PROGRESS NOTE ADULT - SUBJECTIVE AND OBJECTIVE BOX
Orthopedics Post-op Check    Patient seen and examined at bedside, resting comfortably. Pain adequately controlled. Patient feeling well. No nausea or vomiting. No other acute complaints at this time    Vital Signs Last 24 Hrs  T(C): 36.7 (01-06-20 @ 15:48), Max: 37 (01-06-20 @ 06:36)  T(F): 98 (01-06-20 @ 15:48), Max: 98.6 (01-06-20 @ 06:36)  HR: 86 (01-06-20 @ 15:48) (69 - 94)  BP: 145/61 (01-06-20 @ 15:48) (98/53 - 145/61)  BP(mean): --  RR: 17 (01-06-20 @ 15:48) (13 - 18)  SpO2: 93% (01-06-20 @ 15:48) (93% - 100%)                        15.4   14.28 )-----------( 221      ( 06 Jan 2020 10:47 )             45.8     Exam:  Gen: NAD, Awake and alert  LLE:  Dressing clean and dry  +EHL/FHL/TA/GS  SILT L2-S1  +DP  Calf Soft NT  Compartments soft and compressible

## 2020-01-06 NOTE — PHYSICAL THERAPY INITIAL EVALUATION ADULT - PERTINENT HX OF CURRENT PROBLEM, REHAB EVAL
48 yo male with HTN, ADD, HLD and hypothyroidism reports bilateral knee pain secondary to bone on bone OA (left knee hurts more than right). Currently presents with L TKR (1/6/2020).  Scheduled R TKR next week (1/13/2020).

## 2020-01-06 NOTE — DISCHARGE NOTE PROVIDER - HOSPITAL COURSE
The patient is a 49year old Malestatus post elective Total Knee Arthroplasty to the left knee after failing outpatient non-operative conservative management.  Patient presented to [facility] after being medically cleared for an elective surgical procedure. The patient was taken to the operating room on date mentioned above. Prophylactic antibiotics were started before the procedure and continued for 24 hours.  There were no complications during the procedure and patient tolerated the procedure well.  The patient was transferred to recovery room in stable condition and subsequently to surgical floor.  Patient was placed on Lovenox for anticoagulation.  All home medications were continued.  The patient received physical therapy daily and daily labs were followed.  The dressing was kept clean, dry, intact.  The rest of the hospital stay was unremarkable. The patient is now stable for discharge. THIS IS A CASE OF A 50 YO MALE EVALUATED IN THE OFFICE FOR BILATERAL KNEE PAIN WITH SEVERE ENDSTAGE OSTEOARTHRITIS.         PAST MEDICAL & SURGICAL HISTORY:        Unilateral primary osteoarthritis, left knee (M17.12)    Unilateral primary osteoarthritis, unspecified knee (M17.10)    HTN (hypertension) (I10)    Attention deficit disorder (F98.8)    Unilateral inguinal hernia with obstruction and without gangrene, recurrence not specified (K40.30)    Disorder of attention or motor control (F90.9)    HLD (hyperlipidemia) (E78.5)    Hypothyroidism (E03.9)    H/O right knee surgery (Z98.890): &lt; 3 years ago    H/O left knee surgery (Z98.890): w/in the year        Home Medications:        Adderall 20 mg oral tablet: 1 tab(s) orally once a day (06 Jan 2020 08:12)    atorvastatin 40 mg oral tablet: 1 tab(s) orally once a day (in the evening) (06 Jan 2020 08:12)    celecoxib 200 mg oral capsule: 1 cap(s) orally 2 times a day (06 Jan 2020 16:15)    levothyroxine 175 mcg (0.175 mg) oral tablet: 1 tab(s) orally once a day (06 Jan 2020 08:12)    LISINOPRIL   TAB 20MG: orally once a day (in the evening) (06 Jan 2020 08:12)        Allergies        No Known Allergies            HOSPITAL COURSE: AFTER THE RISK AND BENEFITS OF SURGICAL INTERVENTION IN DETAILS WERE DISCUSSED WITH THE PATIENT, A CONSENT WAS OBTAINED. AFTER OBTAINING MEDICAL CLEARANCE AND PREOPERATIVE EVALUATION, THE PATIENT WAS TAKEN TO THE OPERATING ROOM ON 01/06/2020 AND THE PATIENT UNDERWENT A   LEFT TOTAL KNEE REPLACEMENT.        POSTOPERATIVE PHASE, THE PATIENT WAS ANTICOAGULATED WITH LOVENOX AND WAS GIVEN 24 HOURS OF IV ANTIBIOTICS. A PHYSICAL THERAPY CONSULT WAS OBTAINED FOR  WEIGHT BEARING AS TOLERATED.   DUE TO ANEMIA OF ACUTE BLOOD LOSS POST OP  IRON SUPPLEMENT GIVEN.         THE PATIENT WAS TAKEN TO THE OPERATING ROOM ON 01/13/2020 FOR  A  RIGHT TOTAL KNEE REPLACEMENT. POSTOPERATIVE PHASE, THE PATIENT WAS ANTICOAGULATED WITH XARELTO AND WAS GIVEN 24 HOURS OF IV ANTIBIOTICS. A SOCIAL SERVICE CONSULT WAS OBTAINED FOR DISCHARGE PLANNING. A PHYSICAL THERAPY CONSULT WAS OBTAINED FOR  WEIGHT BEARING AS TOLERATED.   DUE TO ANEMIA OF ACUTE BLOOD LOSS POST OP  IRON SUPPLEMENT GIVEN.         DISPOSITION : HOME WITH HOME CARE. WEIGHT BEARING AS TOLERATED. XARELTO 10 MG DAILY FOR TOTAL OF 35 DAYS AS OF 01/14/2020. FOLLOW UP WITH DR. PUENTE  IN 2 WEEKS. KEEP KNEE AQUACEL  DRESSING  ON DRY AND CLEAN, IT WILL BE CHANGED OR REMOVED ON  NEXT OFFICE VISIT.

## 2020-01-06 NOTE — PHYSICAL THERAPY INITIAL EVALUATION ADULT - GENERAL OBSERVATIONS, REHAB EVAL
Pt rec'd in semi-fowlers position with B SCDs, IV on L UE, and dressing over incision site on L knee. His significant other, Ena, is present at [facility]. He is agreeable to PT.

## 2020-01-06 NOTE — PROGRESS NOTE ADULT - ASSESSMENT
A/P:  Patient is a 49y Male s/p L TKA Stable POD 0      -Ice/Elevate  -Incentive Spirometry  -Multimodal Analgesia   -Ppx ABX  -DVT PE ppx - starting pod1  -SCDs  -PT/OT OOB  -WBAT  -Discharge planning - pending PT eval  -Will discuss w/ attending and advise if plan changes

## 2020-01-06 NOTE — DISCHARGE NOTE PROVIDER - NSDCHHNEEDSERVICE_GEN_ALL_CORE
Wound care and assessment/Observation and assessment/Rehabilitation services Rehabilitation services/Observation and assessment

## 2020-01-06 NOTE — DISCHARGE NOTE PROVIDER - NSDCACTIVITY_GEN_ALL_CORE
Showering allowed/Walking - Outdoors allowed/Sex allowed/Stairs allowed/Walking - Indoors allowed Showering allowed/Walking - Outdoors allowed/Stairs allowed/Walking - Indoors allowed

## 2020-01-07 LAB
ANION GAP SERPL CALC-SCNC: 8 MMOL/L — SIGNIFICANT CHANGE UP (ref 5–17)
BUN SERPL-MCNC: 20 MG/DL — SIGNIFICANT CHANGE UP (ref 7–23)
CALCIUM SERPL-MCNC: 8.3 MG/DL — LOW (ref 8.5–10.1)
CHLORIDE SERPL-SCNC: 107 MMOL/L — SIGNIFICANT CHANGE UP (ref 96–108)
CO2 SERPL-SCNC: 25 MMOL/L — SIGNIFICANT CHANGE UP (ref 22–31)
CREAT SERPL-MCNC: 1 MG/DL — SIGNIFICANT CHANGE UP (ref 0.5–1.3)
GLUCOSE SERPL-MCNC: 135 MG/DL — HIGH (ref 70–99)
HCT VFR BLD CALC: 40.1 % — SIGNIFICANT CHANGE UP (ref 39–50)
HGB BLD-MCNC: 13.6 G/DL — SIGNIFICANT CHANGE UP (ref 13–17)
MCHC RBC-ENTMCNC: 29.8 PG — SIGNIFICANT CHANGE UP (ref 27–34)
MCHC RBC-ENTMCNC: 33.9 GM/DL — SIGNIFICANT CHANGE UP (ref 32–36)
MCV RBC AUTO: 87.9 FL — SIGNIFICANT CHANGE UP (ref 80–100)
NRBC # BLD: 0 /100 WBCS — SIGNIFICANT CHANGE UP (ref 0–0)
PLATELET # BLD AUTO: 236 K/UL — SIGNIFICANT CHANGE UP (ref 150–400)
POTASSIUM SERPL-MCNC: 4.2 MMOL/L — SIGNIFICANT CHANGE UP (ref 3.5–5.3)
POTASSIUM SERPL-SCNC: 4.2 MMOL/L — SIGNIFICANT CHANGE UP (ref 3.5–5.3)
RBC # BLD: 4.56 M/UL — SIGNIFICANT CHANGE UP (ref 4.2–5.8)
RBC # FLD: 12.9 % — SIGNIFICANT CHANGE UP (ref 10.3–14.5)
SODIUM SERPL-SCNC: 140 MMOL/L — SIGNIFICANT CHANGE UP (ref 135–145)
WBC # BLD: 17.27 K/UL — HIGH (ref 3.8–10.5)
WBC # FLD AUTO: 17.27 K/UL — HIGH (ref 3.8–10.5)

## 2020-01-07 RX ORDER — TRAMADOL HYDROCHLORIDE 50 MG/1
100 TABLET ORAL EVERY 6 HOURS
Refills: 0 | Status: DISCONTINUED | OUTPATIENT
Start: 2020-01-07 | End: 2020-01-13

## 2020-01-07 RX ORDER — RIVAROXABAN 15 MG-20MG
1 KIT ORAL
Qty: 12 | Refills: 0
Start: 2020-01-07 | End: 2020-01-18

## 2020-01-07 RX ORDER — BENZOCAINE 10 %
1 GEL (GRAM) MUCOUS MEMBRANE EVERY 6 HOURS
Refills: 0 | Status: DISCONTINUED | OUTPATIENT
Start: 2020-01-07 | End: 2020-01-13

## 2020-01-07 RX ADMIN — CELECOXIB 200 MILLIGRAM(S): 200 CAPSULE ORAL at 05:40

## 2020-01-07 RX ADMIN — TRAMADOL HYDROCHLORIDE 100 MILLIGRAM(S): 50 TABLET ORAL at 09:00

## 2020-01-07 RX ADMIN — Medication 1 SPRAY(S): at 17:27

## 2020-01-07 RX ADMIN — CELECOXIB 200 MILLIGRAM(S): 200 CAPSULE ORAL at 18:30

## 2020-01-07 RX ADMIN — Medication 1 SPRAY(S): at 11:15

## 2020-01-07 RX ADMIN — Medication 500 MILLIGRAM(S): at 11:08

## 2020-01-07 RX ADMIN — LISINOPRIL 20 MILLIGRAM(S): 2.5 TABLET ORAL at 05:40

## 2020-01-07 RX ADMIN — Medication 325 MILLIGRAM(S): at 17:27

## 2020-01-07 RX ADMIN — TRAMADOL HYDROCHLORIDE 100 MILLIGRAM(S): 50 TABLET ORAL at 20:57

## 2020-01-07 RX ADMIN — Medication 325 MILLIGRAM(S): at 11:16

## 2020-01-07 RX ADMIN — Medication 1 MILLIGRAM(S): at 11:16

## 2020-01-07 RX ADMIN — Medication 400 MILLIGRAM(S): at 02:08

## 2020-01-07 RX ADMIN — CELECOXIB 200 MILLIGRAM(S): 200 CAPSULE ORAL at 06:30

## 2020-01-07 RX ADMIN — TRAMADOL HYDROCHLORIDE 100 MILLIGRAM(S): 50 TABLET ORAL at 01:08

## 2020-01-07 RX ADMIN — TRAMADOL HYDROCHLORIDE 100 MILLIGRAM(S): 50 TABLET ORAL at 14:21

## 2020-01-07 RX ADMIN — PANTOPRAZOLE SODIUM 40 MILLIGRAM(S): 20 TABLET, DELAYED RELEASE ORAL at 05:40

## 2020-01-07 RX ADMIN — Medication 175 MICROGRAM(S): at 05:39

## 2020-01-07 RX ADMIN — ENOXAPARIN SODIUM 40 MILLIGRAM(S): 100 INJECTION SUBCUTANEOUS at 11:15

## 2020-01-07 RX ADMIN — TRAMADOL HYDROCHLORIDE 100 MILLIGRAM(S): 50 TABLET ORAL at 15:20

## 2020-01-07 RX ADMIN — Medication 325 MILLIGRAM(S): at 08:08

## 2020-01-07 RX ADMIN — Medication 1 SPRAY(S): at 01:04

## 2020-01-07 RX ADMIN — TRAMADOL HYDROCHLORIDE 100 MILLIGRAM(S): 50 TABLET ORAL at 21:55

## 2020-01-07 RX ADMIN — Medication 500 MILLIGRAM(S): at 08:07

## 2020-01-07 RX ADMIN — Medication 1 TABLET(S): at 11:16

## 2020-01-07 RX ADMIN — ATORVASTATIN CALCIUM 40 MILLIGRAM(S): 80 TABLET, FILM COATED ORAL at 20:57

## 2020-01-07 RX ADMIN — Medication 500 MILLIGRAM(S): at 17:27

## 2020-01-07 RX ADMIN — TRAMADOL HYDROCHLORIDE 100 MILLIGRAM(S): 50 TABLET ORAL at 08:08

## 2020-01-07 RX ADMIN — CELECOXIB 200 MILLIGRAM(S): 200 CAPSULE ORAL at 17:27

## 2020-01-07 RX ADMIN — Medication 1000 MILLIGRAM(S): at 02:30

## 2020-01-07 NOTE — OCCUPATIONAL THERAPY INITIAL EVALUATION ADULT - PERTINENT HX OF CURRENT PROBLEM, REHAB EVAL
48 y/o male s/p Stage I Left TKR on 1/6/20 by Dr. Partida. Pt is scheduled for Stage II Right TKR on 1/13/20.

## 2020-01-07 NOTE — OCCUPATIONAL THERAPY INITIAL EVALUATION ADULT - GENERAL OBSERVATIONS, REHAB EVAL
Pt found supine in bed with +IV, +SCD, +bandage, supplemental 02 Pt found supine in bed with +IV lock, +SCD, +bandage left knee and +telemonitor.

## 2020-01-07 NOTE — PROGRESS NOTE ADULT - SUBJECTIVE AND OBJECTIVE BOX
Patient is a 49y old  Male who presents with a chief complaint of left total knee arthroplasty (06 Jan 2020 16:10)  feels well without complaints    INTERVAL HPI/OVERNIGHT EVENTS:  T(C): 36.4 (01-08-20 @ 11:47), Max: 36.6 (01-07-20 @ 15:39)  HR: 93 (01-08-20 @ 11:47) (76 - 97)  BP: 120/78 (01-08-20 @ 11:47) (112/65 - 158/69)  RR: 16 (01-08-20 @ 11:47) (16 - 17)  SpO2: 96% (01-08-20 @ 11:47) (94% - 97%)  Wt(kg): --  I&O's Summary    07 Jan 2020 07:01  -  08 Jan 2020 07:00  --------------------------------------------------------  IN: 0 mL / OUT: 300 mL / NET: -300 mL        LABS:                        12.7   13.70 )-----------( 205      ( 08 Jan 2020 07:05 )             37.9     01-07    140  |  107  |  20  ----------------------------<  135<H>  4.2   |  25  |  1.00    Ca    8.3<L>      07 Jan 2020 06:31          CAPILLARY BLOOD GLUCOSE                MEDICATIONS  (STANDING):  amphetamine/dextroamphetamine 20 milliGRAM(s) Oral daily  ascorbic acid 500 milliGRAM(s) Oral two times a day  atorvastatin 40 milliGRAM(s) Oral at bedtime  celecoxib 200 milliGRAM(s) Oral two times a day  enoxaparin Injectable 40 milliGRAM(s) SubCutaneous daily  ferrous    sulfate 325 milliGRAM(s) Oral three times a day with meals  folic acid 1 milliGRAM(s) Oral daily  levothyroxine 175 MICROGram(s) Oral daily  lisinopril 20 milliGRAM(s) Oral daily  multivitamin 1 Tablet(s) Oral daily  ondansetron Injectable 4 milliGRAM(s) IV Push once  pantoprazole    Tablet 40 milliGRAM(s) Oral before breakfast    MEDICATIONS  (PRN):  acetaminophen   Tablet .. 1000 milliGRAM(s) Oral every 6 hours PRN Mild Pain (1 - 3)  benzocaine 15 mG/menthol 3.6 mG (Sugar-Free) Lozenge 1 Lozenge Oral every 4 hours PRN Sore Throat  benzocaine 20% Spray 1 Spray(s) Mucosal every 6 hours PRN sore throat  HYDROmorphone   Tablet 2 milliGRAM(s) Oral every 4 hours PRN BREAKTHROUGH Severe Pain 10  (7 - 10)  ondansetron Injectable 4 milliGRAM(s) IV Push every 6 hours PRN Nausea and/or Vomiting  senna 2 Tablet(s) Oral at bedtime PRN Constipation  traMADol 50 milliGRAM(s) Oral every 4 hours PRN Moderate Pain (4 - 6)  traMADol 100 milliGRAM(s) Oral every 6 hours PRN Severe Pain (7 - 10)      REVIEW OF SYSTEMS:  CONSTITUTIONAL: No fever, weight loss, or fatigue  EYES: No eye pain, visual disturbances, or discharge  ENMT:  No difficulty hearing, tinnitus, vertigo; No sinus or throat pain  NECK: No pain or stiffness  RESPIRATORY: No cough, wheezing, chills or hemoptysis; No shortness of breath  CARDIOVASCULAR: No chest pain, palpitations, dizziness, or leg swelling  GASTROINTESTINAL: No abdominal or epigastric pain. No nausea, vomiting, or hematemesis; No diarrhea or constipation. No melena or hematochezia.  GENITOURINARY: No dysuria, frequency, hematuria, or incontinence  NEUROLOGICAL: No headaches, memory loss, loss of strength, numbness, or tremors  SKIN: No itching, burning, rashes, or lesions   LYMPH NODES: No enlarged glands  ENDOCRINE: No heat or cold intolerance; No hair loss  MUSCULOSKELETAL: chronic bl knee pain  PSYCHIATRIC: No depression, anxiety, mood swings, or difficulty sleeping  HEME/LYMPH: No easy bruising, or bleeding gums  ALLERY AND IMMUNOLOGIC: No hives or eczema    RADIOLOGY & ADDITIONAL TESTS:    Imaging Personally Reviewed:  [ ] YES  [ ] NO    Consultant(s) Notes Reviewed:  [ ] YES  [ ] NO    PHYSICAL EXAM:  GENERAL: NAD, well-groomed, well-developed  HEAD:  Atraumatic, Normocephalic  EYES: EOMI, PERRLA, conjunctiva and sclera clear  ENMT: No tonsillar erythema, exudates, or enlargement; Moist mucous membranes, Good dentition, No lesions  NECK: Supple, No JVD, Normal thyroid  NERVOUS SYSTEM:  Alert & Oriented X3, Good concentration; Motor Strength 5/5 B/L upper and lower extremities; DTRs 2+ intact and symmetric  CHEST/LUNG: Clear to percussion bilaterally; No rales, rhonchi, wheezing, or rubs  HEART: Regular rate and rhythm; No murmurs, rubs, or gallops  ABDOMEN: Soft, Nontender, Nondistended; Bowel sounds present  EXTREMITIES:  2+ Peripheral Pulses, No clubbing, cyanosis, or edema  LYMPH: No lymphadenopathy noted  SKIN: No rashes or lesions    Care Discussed with Consultants/Other Providers [ ] YES  [ ] NO

## 2020-01-07 NOTE — PROGRESS NOTE ADULT - SUBJECTIVE AND OBJECTIVE BOX
Patient seen and examined at bedside, resting comfortably. Pain adequately controlled. Patient feeling well. No nausea or vomiting. No other acute complaints at this time    Vital Signs Last 24 Hrs  T(C): 36.4 (07 Jan 2020 04:25), Max: 36.9 (06 Jan 2020 13:10)  T(F): 97.5 (07 Jan 2020 04:25), Max: 98.4 (06 Jan 2020 13:10)  HR: 90 (07 Jan 2020 04:25) (69 - 96)  BP: 128/72 (07 Jan 2020 04:25) (98/53 - 145/61)  BP(mean): --  RR: 16 (07 Jan 2020 04:25) (13 - 18)  SpO2: 97% (07 Jan 2020 04:25) (93% - 100%)    Exam:  Gen: NAD, Awake and alert, following commands  LLE:  Dressing clean and dry  +EHL/FHL/TA/GS  SILT L2-S1  +DP  Calf Soft NT  Compartments soft and compressible Patient seen and examined at bedside, resting comfortably. Pain adequately controlled. No acute events overnight. Patient feeling well. No nausea or vomiting. No other acute complaints at this time    Vital Signs Last 24 Hrs  T(C): 36.4 (07 Jan 2020 04:25), Max: 36.9 (06 Jan 2020 13:10)  T(F): 97.5 (07 Jan 2020 04:25), Max: 98.4 (06 Jan 2020 13:10)  HR: 90 (07 Jan 2020 04:25) (69 - 96)  BP: 128/72 (07 Jan 2020 04:25) (98/53 - 145/61)  BP(mean): --  RR: 16 (07 Jan 2020 04:25) (13 - 18)  SpO2: 97% (07 Jan 2020 04:25) (93% - 100%)    Exam:  Gen: NAD, Awake and alert, following commands  LLE:  Dressing clean and dry  +EHL/FHL/TA/GS  SILT L2-S1  +DP  Calf Soft NT  Compartments soft and compressible

## 2020-01-07 NOTE — OCCUPATIONAL THERAPY INITIAL EVALUATION ADULT - ADDITIONAL COMMENTS
Pt reports that he lives in a two-family home. He and his significant other Ena live on the 2nd level. Pt's two sons live on the ground level. There are 2 steps to enter with handrail and one flight of stairs to his apartment with no railings. There is a bedroom, bathroom, and kitchen upstairs. He states that he can stay with his sons if he has to, but would prefer to be able to get up to his own apartment. He states that he was independent in dressing and bathing, and does not own any assistive device. He has a walk-in shower with a grab bar as well as a bathtub. He wants to be able to go home after his second scheduled surgery next week (R TKR on 1/13/2020). Pt reports that he lives in a two-family home. He and his significant other Ena live on the 2nd level. Pt's two sons live on the ground level. There are 2 steps to enter with handrail and 13 steps with no handrail to access bedroom and bathroom. Pt has a stall shower. Pt does not own any DME. Pt wants to go home after his second scheduled surgery next week (R TKR on 1/13/2020).

## 2020-01-07 NOTE — PROGRESS NOTE ADULT - ASSESSMENT
A/P:  Patient is a 49y Male s/p L TKA Stable POD 1    -Plan for R TKA on 1/13/19  -Medical optimization for OR appreciated - Please continue to document regarding clearance/optimization for OR  -Ice/Elevate  -Incentive Spirometry  -Multimodal Analgesia   -Ppx ABX  -DVT PE ppx - starting today  -SCDs  -PT/OT OOB  -WBAT  -Discharge planning - pending R TKA  -Will discuss w/ attending and advise if plan changes

## 2020-01-07 NOTE — PROGRESS NOTE ADULT - SUBJECTIVE AND OBJECTIVE BOX
The patient was interviewed and evaluated.    49y Male    T(C): 36.4 (01-07-20 @ 08:03), Max: 36.9 (01-06-20 @ 13:10)  HR: 95 (01-07-20 @ 09:14) (69 - 96)  BP: 156/60 (01-07-20 @ 09:14) (98/53 - 156/60)  RR: 16 (01-07-20 @ 08:03) (13 - 18)  SpO2: 99% (01-07-20 @ 09:14) (93% - 100%)  Wt(kg): --    No Nausea/vomiting, recall, or headache. Has mild sore throat, no voice change.  No other anesthesia related complaints or sequelae.

## 2020-01-07 NOTE — OCCUPATIONAL THERAPY INITIAL EVALUATION ADULT - PATIENT/FAMILY/SIGNIFICANT OTHER GOALS STATEMENT, OT EVAL
Pt. would like to go home upon d/c from [facility] after Stage II Right TKR procedure is completed.

## 2020-01-08 LAB
HCT VFR BLD CALC: 37.9 % — LOW (ref 39–50)
HGB BLD-MCNC: 12.7 G/DL — LOW (ref 13–17)
MCHC RBC-ENTMCNC: 30.2 PG — SIGNIFICANT CHANGE UP (ref 27–34)
MCHC RBC-ENTMCNC: 33.5 GM/DL — SIGNIFICANT CHANGE UP (ref 32–36)
MCV RBC AUTO: 90 FL — SIGNIFICANT CHANGE UP (ref 80–100)
NRBC # BLD: 0 /100 WBCS — SIGNIFICANT CHANGE UP (ref 0–0)
PLATELET # BLD AUTO: 205 K/UL — SIGNIFICANT CHANGE UP (ref 150–400)
RBC # BLD: 4.21 M/UL — SIGNIFICANT CHANGE UP (ref 4.2–5.8)
RBC # FLD: 13 % — SIGNIFICANT CHANGE UP (ref 10.3–14.5)
SURGICAL PATHOLOGY STUDY: SIGNIFICANT CHANGE UP
WBC # BLD: 13.7 K/UL — HIGH (ref 3.8–10.5)
WBC # FLD AUTO: 13.7 K/UL — HIGH (ref 3.8–10.5)

## 2020-01-08 PROCEDURE — 93970 EXTREMITY STUDY: CPT | Mod: 26

## 2020-01-08 RX ORDER — ACETAMINOPHEN 500 MG
1000 TABLET ORAL ONCE
Refills: 0 | Status: COMPLETED | OUTPATIENT
Start: 2020-01-08 | End: 2020-01-08

## 2020-01-08 RX ADMIN — PANTOPRAZOLE SODIUM 40 MILLIGRAM(S): 20 TABLET, DELAYED RELEASE ORAL at 06:00

## 2020-01-08 RX ADMIN — ENOXAPARIN SODIUM 40 MILLIGRAM(S): 100 INJECTION SUBCUTANEOUS at 12:11

## 2020-01-08 RX ADMIN — Medication 325 MILLIGRAM(S): at 17:27

## 2020-01-08 RX ADMIN — ATORVASTATIN CALCIUM 40 MILLIGRAM(S): 80 TABLET, FILM COATED ORAL at 21:05

## 2020-01-08 RX ADMIN — TRAMADOL HYDROCHLORIDE 100 MILLIGRAM(S): 50 TABLET ORAL at 10:50

## 2020-01-08 RX ADMIN — Medication 325 MILLIGRAM(S): at 12:11

## 2020-01-08 RX ADMIN — CELECOXIB 200 MILLIGRAM(S): 200 CAPSULE ORAL at 17:27

## 2020-01-08 RX ADMIN — CELECOXIB 200 MILLIGRAM(S): 200 CAPSULE ORAL at 18:41

## 2020-01-08 RX ADMIN — TRAMADOL HYDROCHLORIDE 100 MILLIGRAM(S): 50 TABLET ORAL at 02:59

## 2020-01-08 RX ADMIN — Medication 500 MILLIGRAM(S): at 06:00

## 2020-01-08 RX ADMIN — BENZOCAINE AND MENTHOL 1 LOZENGE: 5; 1 LIQUID ORAL at 06:00

## 2020-01-08 RX ADMIN — TRAMADOL HYDROCHLORIDE 100 MILLIGRAM(S): 50 TABLET ORAL at 09:00

## 2020-01-08 RX ADMIN — CELECOXIB 200 MILLIGRAM(S): 200 CAPSULE ORAL at 06:00

## 2020-01-08 RX ADMIN — Medication 500 MILLIGRAM(S): at 17:27

## 2020-01-08 RX ADMIN — Medication 1000 MILLIGRAM(S): at 18:41

## 2020-01-08 RX ADMIN — HYDROMORPHONE HYDROCHLORIDE 2 MILLIGRAM(S): 2 INJECTION INTRAMUSCULAR; INTRAVENOUS; SUBCUTANEOUS at 14:50

## 2020-01-08 RX ADMIN — Medication 400 MILLIGRAM(S): at 17:58

## 2020-01-08 RX ADMIN — Medication 1 MILLIGRAM(S): at 12:11

## 2020-01-08 RX ADMIN — Medication 1 TABLET(S): at 12:11

## 2020-01-08 RX ADMIN — Medication 175 MICROGRAM(S): at 06:00

## 2020-01-08 RX ADMIN — HYDROMORPHONE HYDROCHLORIDE 2 MILLIGRAM(S): 2 INJECTION INTRAMUSCULAR; INTRAVENOUS; SUBCUTANEOUS at 15:50

## 2020-01-08 NOTE — PROGRESS NOTE ADULT - SUBJECTIVE AND OBJECTIVE BOX
Patient seen and examined at bedside. No acute events over night. No acute complaints at this time. Pain well controlled. Denies chest pain, shortness of breath, nausea or vomiting.     PE:  Vital Signs Last 24 Hrs  T(C): 36.5 (01-08-20 @ 05:54), Max: 36.6 (01-07-20 @ 15:39)  T(F): 97.7 (01-08-20 @ 05:54), Max: 97.9 (01-07-20 @ 15:39)  HR: 84 (01-08-20 @ 05:54) (76 - 95)  BP: 116/76 (01-08-20 @ 05:54) (112/65 - 158/69)  BP(mean): --  RR: 16 (01-08-20 @ 05:54) (16 - 17)  SpO2: 97% (01-08-20 @ 05:54) (94% - 99%)    General: NAD, resting comfortably in bed  LLE:   Dressing C/D/I  SCDs present at bedside. Not on pt when examined. Replaced on him  Unilateral leg swelling in the thigh  Compartments soft and compressible  No calf tenderness bilaterally  +TA/EHL/FHL/GSC  SILT L3-S1  2+ DP/PT                          13.6   17.27 )-----------( 236      ( 07 Jan 2020 06:31 )             40.1     07 Jan 2020 06:31    140    |  107    |  20     ----------------------------<  135    4.2     |  25     |  1.00     Ca    8.3        07 Jan 2020 06:31          A/P:  49y m s/p L TKA POD 2  -PT/OT -WBAT LLE  -Pain Control  -DVT ppx w/lovenox  -FU AM Labs  -FU BLLE Dopp  -Rest, ice, compress and elevate the extremity as we needed  -Incentive Spirometry  -Medical management appreciated  -Preop for R TKA 1/13  -Please document preoperative clearance    Ortho [phone]

## 2020-01-08 NOTE — PROGRESS NOTE ADULT - SUBJECTIVE AND OBJECTIVE BOX
Patient is a 49y old  Male who presents with a chief complaint of left total knee arthroplasty (06 Jan 2020 16:10)  feels well, other than post op knee discomfort      INTERVAL HPI/OVERNIGHT EVENTS:  T(C): 36.4 (01-08-20 @ 11:47), Max: 36.6 (01-07-20 @ 15:39)  HR: 93 (01-08-20 @ 11:47) (84 - 97)  BP: 120/78 (01-08-20 @ 11:47) (116/76 - 158/69)  RR: 16 (01-08-20 @ 11:47) (16 - 17)  SpO2: 96% (01-08-20 @ 11:47) (94% - 97%)  Wt(kg): --  I&O's Summary    07 Jan 2020 07:01  -  08 Jan 2020 07:00  --------------------------------------------------------  IN: 0 mL / OUT: 300 mL / NET: -300 mL        LABS:                        12.7   13.70 )-----------( 205      ( 08 Jan 2020 07:05 )             37.9     01-07    140  |  107  |  20  ----------------------------<  135<H>  4.2   |  25  |  1.00    Ca    8.3<L>      07 Jan 2020 06:31          CAPILLARY BLOOD GLUCOSE                MEDICATIONS  (STANDING):  amphetamine/dextroamphetamine 20 milliGRAM(s) Oral daily  ascorbic acid 500 milliGRAM(s) Oral two times a day  atorvastatin 40 milliGRAM(s) Oral at bedtime  celecoxib 200 milliGRAM(s) Oral two times a day  enoxaparin Injectable 40 milliGRAM(s) SubCutaneous daily  ferrous    sulfate 325 milliGRAM(s) Oral three times a day with meals  folic acid 1 milliGRAM(s) Oral daily  levothyroxine 175 MICROGram(s) Oral daily  lisinopril 20 milliGRAM(s) Oral daily  multivitamin 1 Tablet(s) Oral daily  ondansetron Injectable 4 milliGRAM(s) IV Push once  pantoprazole    Tablet 40 milliGRAM(s) Oral before breakfast    MEDICATIONS  (PRN):  acetaminophen   Tablet .. 1000 milliGRAM(s) Oral every 6 hours PRN Mild Pain (1 - 3)  benzocaine 15 mG/menthol 3.6 mG (Sugar-Free) Lozenge 1 Lozenge Oral every 4 hours PRN Sore Throat  benzocaine 20% Spray 1 Spray(s) Mucosal every 6 hours PRN sore throat  HYDROmorphone   Tablet 2 milliGRAM(s) Oral every 4 hours PRN BREAKTHROUGH Severe Pain 10  (7 - 10)  ondansetron Injectable 4 milliGRAM(s) IV Push every 6 hours PRN Nausea and/or Vomiting  senna 2 Tablet(s) Oral at bedtime PRN Constipation  traMADol 50 milliGRAM(s) Oral every 4 hours PRN Moderate Pain (4 - 6)  traMADol 100 milliGRAM(s) Oral every 6 hours PRN Severe Pain (7 - 10)      REVIEW OF SYSTEMS:  CONSTITUTIONAL: No fever, weight loss, or fatigue  EYES: No eye pain, visual disturbances, or discharge  ENMT:  No difficulty hearing, tinnitus, vertigo; No sinus or throat pain  NECK: No pain or stiffness  RESPIRATORY: No cough, wheezing, chills or hemoptysis; No shortness of breath  CARDIOVASCULAR: No chest pain, palpitations, dizziness, or leg swelling  GASTROINTESTINAL: No abdominal or epigastric pain. No nausea, vomiting, or hematemesis; No diarrhea or constipation. No melena or hematochezia.  GENITOURINARY: No dysuria, frequency, hematuria, or incontinence  NEUROLOGICAL: No headaches, memory loss, loss of strength, numbness, or tremors  SKIN: No itching, burning, rashes, or lesions   LYMPH NODES: No enlarged glands  ENDOCRINE: No heat or cold intolerance; No hair loss  MUSCULOSKELETAL: No joint pain or swelling; No muscle, back, or extremity pain  PSYCHIATRIC: No depression, anxiety, mood swings, or difficulty sleeping  HEME/LYMPH: No easy bruising, or bleeding gums  ALLERY AND IMMUNOLOGIC: No hives or eczema    RADIOLOGY & ADDITIONAL TESTS:    Imaging Personally Reviewed:  [ ] YES  [ ] NO    Consultant(s) Notes Reviewed:  [ ] YES  [ ] NO    PHYSICAL EXAM:  GENERAL: NAD, well-groomed, well-developed  HEAD:  Atraumatic, Normocephalic  EYES: EOMI, PERRLA, conjunctiva and sclera clear  ENMT: No tonsillar erythema, exudates, or enlargement; Moist mucous membranes, Good dentition, No lesions  NECK: Supple, No JVD, Normal thyroid  NERVOUS SYSTEM:  Alert & Oriented X3, Good concentration; Motor Strength 5/5 B/L upper and lower extremities; DTRs 2+ intact and symmetric  CHEST/LUNG: Clear to percussion bilaterally; No rales, rhonchi, wheezing, or rubs  HEART: Regular rate and rhythm; No murmurs, rubs, or gallops  ABDOMEN: Soft, Nontender, Nondistended; Bowel sounds present  EXTREMITIES: chronic bilateral knee pain  LYMPH: No lymphadenopathy noted  SKIN: No rashes or lesions    Care Discussed with Consultants/Other Providers [ ] YES  [ ] NO

## 2020-01-09 LAB
ANION GAP SERPL CALC-SCNC: 6 MMOL/L — SIGNIFICANT CHANGE UP (ref 5–17)
BUN SERPL-MCNC: 17 MG/DL — SIGNIFICANT CHANGE UP (ref 7–23)
CALCIUM SERPL-MCNC: 8.6 MG/DL — SIGNIFICANT CHANGE UP (ref 8.5–10.1)
CHLORIDE SERPL-SCNC: 106 MMOL/L — SIGNIFICANT CHANGE UP (ref 96–108)
CO2 SERPL-SCNC: 29 MMOL/L — SIGNIFICANT CHANGE UP (ref 22–31)
CREAT SERPL-MCNC: 0.96 MG/DL — SIGNIFICANT CHANGE UP (ref 0.5–1.3)
GLUCOSE SERPL-MCNC: 99 MG/DL — SIGNIFICANT CHANGE UP (ref 70–99)
HCT VFR BLD CALC: 36.5 % — LOW (ref 39–50)
HGB BLD-MCNC: 12.3 G/DL — LOW (ref 13–17)
MCHC RBC-ENTMCNC: 29.9 PG — SIGNIFICANT CHANGE UP (ref 27–34)
MCHC RBC-ENTMCNC: 33.7 GM/DL — SIGNIFICANT CHANGE UP (ref 32–36)
MCV RBC AUTO: 88.8 FL — SIGNIFICANT CHANGE UP (ref 80–100)
NRBC # BLD: 0 /100 WBCS — SIGNIFICANT CHANGE UP (ref 0–0)
PLATELET # BLD AUTO: 210 K/UL — SIGNIFICANT CHANGE UP (ref 150–400)
POTASSIUM SERPL-MCNC: 4.3 MMOL/L — SIGNIFICANT CHANGE UP (ref 3.5–5.3)
POTASSIUM SERPL-SCNC: 4.3 MMOL/L — SIGNIFICANT CHANGE UP (ref 3.5–5.3)
RBC # BLD: 4.11 M/UL — LOW (ref 4.2–5.8)
RBC # FLD: 12.5 % — SIGNIFICANT CHANGE UP (ref 10.3–14.5)
SODIUM SERPL-SCNC: 141 MMOL/L — SIGNIFICANT CHANGE UP (ref 135–145)
WBC # BLD: 10.87 K/UL — HIGH (ref 3.8–10.5)
WBC # FLD AUTO: 10.87 K/UL — HIGH (ref 3.8–10.5)

## 2020-01-09 RX ORDER — OXYCODONE HYDROCHLORIDE 5 MG/1
10 TABLET ORAL EVERY 6 HOURS
Refills: 0 | Status: DISCONTINUED | OUTPATIENT
Start: 2020-01-09 | End: 2020-01-09

## 2020-01-09 RX ORDER — OXYCODONE HYDROCHLORIDE 5 MG/1
5 TABLET ORAL EVERY 6 HOURS
Refills: 0 | Status: DISCONTINUED | OUTPATIENT
Start: 2020-01-09 | End: 2020-01-13

## 2020-01-09 RX ADMIN — TRAMADOL HYDROCHLORIDE 100 MILLIGRAM(S): 50 TABLET ORAL at 17:10

## 2020-01-09 RX ADMIN — TRAMADOL HYDROCHLORIDE 100 MILLIGRAM(S): 50 TABLET ORAL at 22:24

## 2020-01-09 RX ADMIN — Medication 500 MILLIGRAM(S): at 05:10

## 2020-01-09 RX ADMIN — PANTOPRAZOLE SODIUM 40 MILLIGRAM(S): 20 TABLET, DELAYED RELEASE ORAL at 05:10

## 2020-01-09 RX ADMIN — ENOXAPARIN SODIUM 40 MILLIGRAM(S): 100 INJECTION SUBCUTANEOUS at 11:40

## 2020-01-09 RX ADMIN — Medication 1 TABLET(S): at 11:40

## 2020-01-09 RX ADMIN — TRAMADOL HYDROCHLORIDE 100 MILLIGRAM(S): 50 TABLET ORAL at 04:30

## 2020-01-09 RX ADMIN — TRAMADOL HYDROCHLORIDE 100 MILLIGRAM(S): 50 TABLET ORAL at 03:28

## 2020-01-09 RX ADMIN — OXYCODONE HYDROCHLORIDE 5 MILLIGRAM(S): 5 TABLET ORAL at 11:40

## 2020-01-09 RX ADMIN — CELECOXIB 200 MILLIGRAM(S): 200 CAPSULE ORAL at 18:36

## 2020-01-09 RX ADMIN — TRAMADOL HYDROCHLORIDE 100 MILLIGRAM(S): 50 TABLET ORAL at 23:00

## 2020-01-09 RX ADMIN — CELECOXIB 200 MILLIGRAM(S): 200 CAPSULE ORAL at 17:37

## 2020-01-09 RX ADMIN — TRAMADOL HYDROCHLORIDE 100 MILLIGRAM(S): 50 TABLET ORAL at 10:30

## 2020-01-09 RX ADMIN — Medication 325 MILLIGRAM(S): at 11:40

## 2020-01-09 RX ADMIN — TRAMADOL HYDROCHLORIDE 100 MILLIGRAM(S): 50 TABLET ORAL at 16:14

## 2020-01-09 RX ADMIN — OXYCODONE HYDROCHLORIDE 5 MILLIGRAM(S): 5 TABLET ORAL at 17:37

## 2020-01-09 RX ADMIN — Medication 325 MILLIGRAM(S): at 17:37

## 2020-01-09 RX ADMIN — TRAMADOL HYDROCHLORIDE 100 MILLIGRAM(S): 50 TABLET ORAL at 09:30

## 2020-01-09 RX ADMIN — Medication 175 MICROGRAM(S): at 05:10

## 2020-01-09 RX ADMIN — OXYCODONE HYDROCHLORIDE 5 MILLIGRAM(S): 5 TABLET ORAL at 18:36

## 2020-01-09 RX ADMIN — Medication 1 MILLIGRAM(S): at 11:40

## 2020-01-09 RX ADMIN — Medication 500 MILLIGRAM(S): at 17:37

## 2020-01-09 RX ADMIN — CELECOXIB 200 MILLIGRAM(S): 200 CAPSULE ORAL at 05:10

## 2020-01-09 RX ADMIN — HYDROMORPHONE HYDROCHLORIDE 2 MILLIGRAM(S): 2 INJECTION INTRAMUSCULAR; INTRAVENOUS; SUBCUTANEOUS at 05:10

## 2020-01-09 RX ADMIN — OXYCODONE HYDROCHLORIDE 5 MILLIGRAM(S): 5 TABLET ORAL at 12:40

## 2020-01-09 NOTE — PROGRESS NOTE ADULT - SUBJECTIVE AND OBJECTIVE BOX
Patient seen and examined at bedside. No acute events over night. No acute complaints at this time. Pain well controlled. Denies chest pain, shortness of breath, nausea or vomiting.     PE:  Vital Signs Last 24 Hrs  T(C): 36.8 (09 Jan 2020 04:57), Max: 36.8 (09 Jan 2020 04:57)  T(F): 98.3 (09 Jan 2020 04:57), Max: 98.3 (09 Jan 2020 04:57)  HR: 88 (09 Jan 2020 04:57) (84 - 97)  BP: 100/61 (09 Jan 2020 04:57) (100/61 - 138/81)  BP(mean): --  RR: 16 (09 Jan 2020 04:57) (16 - 16)  SpO2: 95% (09 Jan 2020 04:57) (93% - 97%)    General: NAD, resting comfortably in bed  LLE:   Dressing C/D/I  SCDs present at bedside. Not on pt when examined. Replaced on him  Unilateral leg swelling in the thigh  Compartments soft and compressible  No calf tenderness bilaterally  +TA/EHL/FHL/GSC  SILT L2-S1  2+ DP/PT

## 2020-01-09 NOTE — PROGRESS NOTE ADULT - ASSESSMENT
Patient is a 49y Male s/p L TKA Stable POD 3    -Plan for R TKA on 1/13/19  -Medical optimization for OR appreciated - Please continue to document regarding clearance/optimization for OR  -Ice/Elevate  -Incentive Spirometry  -Multimodal Analgesia   -Ppx ABX  -DVT PE ppx  -SCDs  -PT/OT OOB  -WBAT  -Discharge planning - pending R TKA  -Will discuss w/ attending and advise if plan changes

## 2020-01-09 NOTE — PROGRESS NOTE ADULT - SUBJECTIVE AND OBJECTIVE BOX
Patient is a 49y old  Male who presents with a chief complaint of left total knee arthroplasty (06 Jan 2020 16:10)  feels well presently, much less pain      INTERVAL HPI/OVERNIGHT EVENTS:  T(C): 36.6 (01-09-20 @ 07:53), Max: 36.8 (01-09-20 @ 04:57)  HR: 87 (01-09-20 @ 07:53) (84 - 88)  BP: 131/80 (01-09-20 @ 07:53) (100/61 - 138/81)  RR: 16 (01-09-20 @ 07:53) (16 - 16)  SpO2: 94% (01-09-20 @ 07:53) (93% - 97%)  Wt(kg): --  I&O's Summary    08 Jan 2020 07:01  -  09 Jan 2020 07:00  --------------------------------------------------------  IN: 125 mL / OUT: 0 mL / NET: 125 mL        LABS:                        12.3   10.87 )-----------( 210      ( 09 Jan 2020 06:45 )             36.5     01-09    141  |  106  |  17  ----------------------------<  99  4.3   |  29  |  0.96    Ca    8.6      09 Jan 2020 06:45          CAPILLARY BLOOD GLUCOSE                MEDICATIONS  (STANDING):  amphetamine/dextroamphetamine 20 milliGRAM(s) Oral daily  ascorbic acid 500 milliGRAM(s) Oral two times a day  atorvastatin 40 milliGRAM(s) Oral at bedtime  celecoxib 200 milliGRAM(s) Oral two times a day  enoxaparin Injectable 40 milliGRAM(s) SubCutaneous daily  ferrous    sulfate 325 milliGRAM(s) Oral three times a day with meals  folic acid 1 milliGRAM(s) Oral daily  levothyroxine 175 MICROGram(s) Oral daily  lisinopril 20 milliGRAM(s) Oral daily  multivitamin 1 Tablet(s) Oral daily  ondansetron Injectable 4 milliGRAM(s) IV Push once  pantoprazole    Tablet 40 milliGRAM(s) Oral before breakfast    MEDICATIONS  (PRN):  acetaminophen   Tablet .. 1000 milliGRAM(s) Oral every 6 hours PRN Mild Pain (1 - 3)  benzocaine 15 mG/menthol 3.6 mG (Sugar-Free) Lozenge 1 Lozenge Oral every 4 hours PRN Sore Throat  benzocaine 20% Spray 1 Spray(s) Mucosal every 6 hours PRN sore throat  ondansetron Injectable 4 milliGRAM(s) IV Push every 6 hours PRN Nausea and/or Vomiting  oxyCODONE    IR 5 milliGRAM(s) Oral every 6 hours PRN Severe Pain (7 - 10)  senna 2 Tablet(s) Oral at bedtime PRN Constipation  traMADol 50 milliGRAM(s) Oral every 4 hours PRN Moderate Pain (4 - 6)  traMADol 100 milliGRAM(s) Oral every 6 hours PRN Severe Pain (7 - 10)      REVIEW OF SYSTEMS:  CONSTITUTIONAL: No fever, weight loss, or fatigue  EYES: No eye pain, visual disturbances, or discharge  ENMT:  No difficulty hearing, tinnitus, vertigo; No sinus or throat pain  NECK: No pain or stiffness  RESPIRATORY: No cough, wheezing, chills or hemoptysis; No shortness of breath  CARDIOVASCULAR: No chest pain, palpitations, dizziness, or leg swelling  GASTROINTESTINAL: No abdominal or epigastric pain. No nausea, vomiting, or hematemesis; No diarrhea or constipation. No melena or hematochezia.  GENITOURINARY: No dysuria, frequency, hematuria, or incontinence  NEUROLOGICAL: No headaches, memory loss, loss of strength, numbness, or tremors  SKIN: No itching, burning, rashes, or lesions   LYMPH NODES: No enlarged glands  ENDOCRINE: No heat or cold intolerance; No hair loss  MUSCULOSKELETAL: chronic bl knee pain  PSYCHIATRIC: No depression, anxiety, mood swings, or difficulty sleeping  HEME/LYMPH: No easy bruising, or bleeding gums  ALLERY AND IMMUNOLOGIC: No hives or eczema    RADIOLOGY & ADDITIONAL TESTS:    Imaging Personally Reviewed:  [ ] YES  [ ] NO    Consultant(s) Notes Reviewed:  [ ] YES  [ ] NO    PHYSICAL EXAM:  GENERAL: NAD, well-groomed, well-developed  HEAD:  Atraumatic, Normocephalic  EYES: EOMI, PERRLA, conjunctiva and sclera clear  ENMT: No tonsillar erythema, exudates, or enlargement; Moist mucous membranes, Good dentition, No lesions  NECK: Supple, No JVD, Normal thyroid  NERVOUS SYSTEM:  Alert & Oriented X3, Good concentration; Motor Strength 5/5 B/L upper and lower extremities; DTRs 2+ intact and symmetric  CHEST/LUNG: Clear to percussion bilaterally; No rales, rhonchi, wheezing, or rubs  HEART: Regular rate and rhythm; No murmurs, rubs, or gallops  ABDOMEN: Soft, Nontender, Nondistended; Bowel sounds present  EXTREMITIES:  2+ Peripheral Pulses, No clubbing, cyanosis, or edema  LYMPH: No lymphadenopathy noted  SKIN: No rashes or lesions    Care Discussed with Consultants/Other Providers [ ] YES  [ ] NO

## 2020-01-10 LAB
ANION GAP SERPL CALC-SCNC: 7 MMOL/L — SIGNIFICANT CHANGE UP (ref 5–17)
BUN SERPL-MCNC: 21 MG/DL — SIGNIFICANT CHANGE UP (ref 7–23)
CALCIUM SERPL-MCNC: 8.5 MG/DL — SIGNIFICANT CHANGE UP (ref 8.5–10.1)
CHLORIDE SERPL-SCNC: 105 MMOL/L — SIGNIFICANT CHANGE UP (ref 96–108)
CO2 SERPL-SCNC: 27 MMOL/L — SIGNIFICANT CHANGE UP (ref 22–31)
CREAT SERPL-MCNC: 0.92 MG/DL — SIGNIFICANT CHANGE UP (ref 0.5–1.3)
GLUCOSE SERPL-MCNC: 105 MG/DL — HIGH (ref 70–99)
HCT VFR BLD CALC: 36.4 % — LOW (ref 39–50)
HGB BLD-MCNC: 12.5 G/DL — LOW (ref 13–17)
MCHC RBC-ENTMCNC: 30.1 PG — SIGNIFICANT CHANGE UP (ref 27–34)
MCHC RBC-ENTMCNC: 34.3 GM/DL — SIGNIFICANT CHANGE UP (ref 32–36)
MCV RBC AUTO: 87.7 FL — SIGNIFICANT CHANGE UP (ref 80–100)
NRBC # BLD: 0 /100 WBCS — SIGNIFICANT CHANGE UP (ref 0–0)
PLATELET # BLD AUTO: 231 K/UL — SIGNIFICANT CHANGE UP (ref 150–400)
POTASSIUM SERPL-MCNC: 3.9 MMOL/L — SIGNIFICANT CHANGE UP (ref 3.5–5.3)
POTASSIUM SERPL-SCNC: 3.9 MMOL/L — SIGNIFICANT CHANGE UP (ref 3.5–5.3)
RBC # BLD: 4.15 M/UL — LOW (ref 4.2–5.8)
RBC # FLD: 12.5 % — SIGNIFICANT CHANGE UP (ref 10.3–14.5)
SODIUM SERPL-SCNC: 139 MMOL/L — SIGNIFICANT CHANGE UP (ref 135–145)
WBC # BLD: 8.94 K/UL — SIGNIFICANT CHANGE UP (ref 3.8–10.5)
WBC # FLD AUTO: 8.94 K/UL — SIGNIFICANT CHANGE UP (ref 3.8–10.5)

## 2020-01-10 RX ORDER — SENNA PLUS 8.6 MG/1
2 TABLET ORAL ONCE
Refills: 0 | Status: COMPLETED | OUTPATIENT
Start: 2020-01-10 | End: 2020-01-10

## 2020-01-10 RX ADMIN — Medication 500 MILLIGRAM(S): at 05:24

## 2020-01-10 RX ADMIN — Medication 175 MICROGRAM(S): at 05:25

## 2020-01-10 RX ADMIN — OXYCODONE HYDROCHLORIDE 5 MILLIGRAM(S): 5 TABLET ORAL at 07:30

## 2020-01-10 RX ADMIN — Medication 325 MILLIGRAM(S): at 17:34

## 2020-01-10 RX ADMIN — TRAMADOL HYDROCHLORIDE 100 MILLIGRAM(S): 50 TABLET ORAL at 06:45

## 2020-01-10 RX ADMIN — CELECOXIB 200 MILLIGRAM(S): 200 CAPSULE ORAL at 05:24

## 2020-01-10 RX ADMIN — TRAMADOL HYDROCHLORIDE 100 MILLIGRAM(S): 50 TABLET ORAL at 06:15

## 2020-01-10 RX ADMIN — LISINOPRIL 20 MILLIGRAM(S): 2.5 TABLET ORAL at 05:25

## 2020-01-10 RX ADMIN — PANTOPRAZOLE SODIUM 40 MILLIGRAM(S): 20 TABLET, DELAYED RELEASE ORAL at 05:25

## 2020-01-10 RX ADMIN — OXYCODONE HYDROCHLORIDE 5 MILLIGRAM(S): 5 TABLET ORAL at 19:30

## 2020-01-10 RX ADMIN — SENNA PLUS 2 TABLET(S): 8.6 TABLET ORAL at 21:54

## 2020-01-10 RX ADMIN — ATORVASTATIN CALCIUM 40 MILLIGRAM(S): 80 TABLET, FILM COATED ORAL at 00:06

## 2020-01-10 RX ADMIN — Medication 500 MILLIGRAM(S): at 17:34

## 2020-01-10 RX ADMIN — ENOXAPARIN SODIUM 40 MILLIGRAM(S): 100 INJECTION SUBCUTANEOUS at 11:28

## 2020-01-10 RX ADMIN — CELECOXIB 200 MILLIGRAM(S): 200 CAPSULE ORAL at 17:34

## 2020-01-10 RX ADMIN — TRAMADOL HYDROCHLORIDE 100 MILLIGRAM(S): 50 TABLET ORAL at 16:06

## 2020-01-10 RX ADMIN — CELECOXIB 200 MILLIGRAM(S): 200 CAPSULE ORAL at 05:54

## 2020-01-10 RX ADMIN — TRAMADOL HYDROCHLORIDE 100 MILLIGRAM(S): 50 TABLET ORAL at 17:06

## 2020-01-10 RX ADMIN — OXYCODONE HYDROCHLORIDE 5 MILLIGRAM(S): 5 TABLET ORAL at 18:41

## 2020-01-10 RX ADMIN — OXYCODONE HYDROCHLORIDE 5 MILLIGRAM(S): 5 TABLET ORAL at 00:03

## 2020-01-10 RX ADMIN — OXYCODONE HYDROCHLORIDE 5 MILLIGRAM(S): 5 TABLET ORAL at 08:30

## 2020-01-10 RX ADMIN — Medication 1 MILLIGRAM(S): at 11:27

## 2020-01-10 RX ADMIN — Medication 1 TABLET(S): at 11:28

## 2020-01-10 RX ADMIN — OXYCODONE HYDROCHLORIDE 5 MILLIGRAM(S): 5 TABLET ORAL at 00:33

## 2020-01-10 RX ADMIN — SENNA PLUS 2 TABLET(S): 8.6 TABLET ORAL at 05:59

## 2020-01-10 RX ADMIN — Medication 325 MILLIGRAM(S): at 07:32

## 2020-01-10 RX ADMIN — ATORVASTATIN CALCIUM 40 MILLIGRAM(S): 80 TABLET, FILM COATED ORAL at 21:55

## 2020-01-10 RX ADMIN — Medication 325 MILLIGRAM(S): at 11:30

## 2020-01-10 NOTE — PROGRESS NOTE ADULT - SUBJECTIVE AND OBJECTIVE BOX
Patient seen and examined at bedside. No acute events over night. No acute complaints at this time. Pain well controlled. Denies chest pain, shortness of breath, nausea or vomiting.     Vital Signs Last 24 Hrs  T(C): 36.7 (10 Justin 2020 00:22), Max: 36.8 (09 Jan 2020 16:19)  T(F): 98.1 (10 Justin 2020 00:22), Max: 98.2 (09 Jan 2020 16:19)  HR: 80 (10 Justin 2020 05:22) (80 - 96)  BP: 137/79 (10 Justin 2020 05:22) (120/75 - 137/79)  BP(mean): --  RR: 16 (10 Justin 2020 00:22) (16 - 16)  SpO2: 96% (10 Justin 2020 00:22) (93% - 96%)                          12.5   8.94  )-----------( 231      ( 10 Justin 2020 06:52 )             36.4   01-09    141  |  106  |  17  ----------------------------<  99  4.3   |  29  |  0.96    Ca    8.6      09 Jan 2020 06:45      General: NAD, resting comfortably in bed  LLE:   Dressing C/D/I  SCDs present at bedside. Not on pt when examined. Replaced on him  Unilateral leg swelling in the thigh  Compartments soft and compressible  No calf tenderness bilaterally  +TA/EHL/FHL/GSC  SILT L2-S1  2+ DP/PT

## 2020-01-10 NOTE — PROGRESS NOTE ADULT - SUBJECTIVE AND OBJECTIVE BOX
Patient is a 49y old  Male who presents with a chief complaint of left total knee arthroplasty (06 Jan 2020 16:10)      INTERVAL HPI/OVERNIGHT EVENTS:  T(C): 36.4 (01-10-20 @ 08:08), Max: 36.8 (01-09-20 @ 16:19)  HR: 87 (01-10-20 @ 08:08) (80 - 96)  BP: 128/78 (01-10-20 @ 08:08) (120/75 - 137/79)  RR: 16 (01-10-20 @ 08:08) (16 - 16)  SpO2: 94% (01-10-20 @ 08:08) (93% - 96%)  Wt(kg): --  I&O's Summary      LABS:                        12.5   8.94  )-----------( 231      ( 10 Justin 2020 06:52 )             36.4     01-10    139  |  105  |  21  ----------------------------<  105<H>  3.9   |  27  |  0.92    Ca    8.5      10 Justin 2020 06:52          CAPILLARY BLOOD GLUCOSE                MEDICATIONS  (STANDING):  amphetamine/dextroamphetamine 20 milliGRAM(s) Oral daily  ascorbic acid 500 milliGRAM(s) Oral two times a day  atorvastatin 40 milliGRAM(s) Oral at bedtime  celecoxib 200 milliGRAM(s) Oral two times a day  enoxaparin Injectable 40 milliGRAM(s) SubCutaneous daily  ferrous    sulfate 325 milliGRAM(s) Oral three times a day with meals  folic acid 1 milliGRAM(s) Oral daily  levothyroxine 175 MICROGram(s) Oral daily  lisinopril 20 milliGRAM(s) Oral daily  multivitamin 1 Tablet(s) Oral daily  ondansetron Injectable 4 milliGRAM(s) IV Push once  pantoprazole    Tablet 40 milliGRAM(s) Oral before breakfast    MEDICATIONS  (PRN):  acetaminophen   Tablet .. 1000 milliGRAM(s) Oral every 6 hours PRN Mild Pain (1 - 3)  benzocaine 15 mG/menthol 3.6 mG (Sugar-Free) Lozenge 1 Lozenge Oral every 4 hours PRN Sore Throat  benzocaine 20% Spray 1 Spray(s) Mucosal every 6 hours PRN sore throat  ondansetron Injectable 4 milliGRAM(s) IV Push every 6 hours PRN Nausea and/or Vomiting  oxyCODONE    IR 5 milliGRAM(s) Oral every 6 hours PRN Severe Pain (7 - 10)  senna 2 Tablet(s) Oral at bedtime PRN Constipation  traMADol 100 milliGRAM(s) Oral every 6 hours PRN Severe Pain (7 - 10)  traMADol 50 milliGRAM(s) Oral every 4 hours PRN Moderate Pain (4 - 6)      REVIEW OF SYSTEMS:  CONSTITUTIONAL: No fever, weight loss, or fatigue  EYES: No eye pain, visual disturbances, or discharge  ENMT:  No difficulty hearing, tinnitus, vertigo; No sinus or throat pain  NECK: No pain or stiffness  RESPIRATORY: No cough, wheezing, chills or hemoptysis; No shortness of breath  CARDIOVASCULAR: No chest pain, palpitations, dizziness, or leg swelling  GASTROINTESTINAL: No abdominal or epigastric pain. No nausea, vomiting, or hematemesis; No diarrhea or constipation. No melena or hematochezia.  MUSCULOSKELETAL: No joint pain or swelling; No muscle, back, or extremity pain  RADIOLOGY & ADDITIONAL TESTS:    Imaging Personally Reviewed:  [ ] YES  [ ] NO    Consultant(s) Notes Reviewed:  [ ] YES  [ ] NO    PHYSICAL EXAM:  GENERAL: NAD, well-groomed, well-developed  HEAD:  Atraumatic, Normocephalic  NERVOUS SYSTEM:  Alert & Oriented X3, Good concentration; Motor Strength 5/5 B/L upper and lower extremities; DTRs 2+ intact and symmetric  CHEST/LUNG: Clear to percussion bilaterally; No rales, rhonchi, wheezing, or rubs  HEART: Regular rate and rhythm; No murmurs, rubs, or gallops  ABDOMEN: Soft, Nontender, Nondistended; Bowel sounds present  EXTREMITIES:  2+ Peripheral Pulses, No clubbing, cyanosis, or edema  Care Discussed with Consultants/Other Providers [ ] YES  [ ] NO

## 2020-01-10 NOTE — PROGRESS NOTE ADULT - ASSESSMENT
Patient is a 49y Male s/p L TKA Stable POD 4    -Plan for R TKA on 1/13/19  -Medical optimization for OR appreciated - Please continue to document regarding clearance/optimization for OR  -Ice/Elevate  -Incentive Spirometry  -Multimodal Analgesia   -Ppx ABX  -DVT PE ppx  -SCDs  -PT/OT OOB  -WBAT  -Discharge planning - pending R TKA  -Will discuss w/ attending and advise if plan changes

## 2020-01-11 RX ADMIN — OXYCODONE HYDROCHLORIDE 5 MILLIGRAM(S): 5 TABLET ORAL at 13:42

## 2020-01-11 RX ADMIN — LISINOPRIL 20 MILLIGRAM(S): 2.5 TABLET ORAL at 05:54

## 2020-01-11 RX ADMIN — OXYCODONE HYDROCHLORIDE 5 MILLIGRAM(S): 5 TABLET ORAL at 06:30

## 2020-01-11 RX ADMIN — OXYCODONE HYDROCHLORIDE 5 MILLIGRAM(S): 5 TABLET ORAL at 22:20

## 2020-01-11 RX ADMIN — Medication 325 MILLIGRAM(S): at 17:38

## 2020-01-11 RX ADMIN — TRAMADOL HYDROCHLORIDE 100 MILLIGRAM(S): 50 TABLET ORAL at 05:15

## 2020-01-11 RX ADMIN — CELECOXIB 200 MILLIGRAM(S): 200 CAPSULE ORAL at 17:38

## 2020-01-11 RX ADMIN — Medication 500 MILLIGRAM(S): at 05:54

## 2020-01-11 RX ADMIN — TRAMADOL HYDROCHLORIDE 100 MILLIGRAM(S): 50 TABLET ORAL at 19:47

## 2020-01-11 RX ADMIN — PANTOPRAZOLE SODIUM 40 MILLIGRAM(S): 20 TABLET, DELAYED RELEASE ORAL at 05:54

## 2020-01-11 RX ADMIN — ENOXAPARIN SODIUM 40 MILLIGRAM(S): 100 INJECTION SUBCUTANEOUS at 12:04

## 2020-01-11 RX ADMIN — Medication 1 MILLIGRAM(S): at 12:04

## 2020-01-11 RX ADMIN — CELECOXIB 200 MILLIGRAM(S): 200 CAPSULE ORAL at 05:53

## 2020-01-11 RX ADMIN — Medication 325 MILLIGRAM(S): at 07:48

## 2020-01-11 RX ADMIN — OXYCODONE HYDROCHLORIDE 5 MILLIGRAM(S): 5 TABLET ORAL at 05:54

## 2020-01-11 RX ADMIN — TRAMADOL HYDROCHLORIDE 100 MILLIGRAM(S): 50 TABLET ORAL at 12:07

## 2020-01-11 RX ADMIN — Medication 1 TABLET(S): at 12:04

## 2020-01-11 RX ADMIN — ATORVASTATIN CALCIUM 40 MILLIGRAM(S): 80 TABLET, FILM COATED ORAL at 21:35

## 2020-01-11 RX ADMIN — CELECOXIB 200 MILLIGRAM(S): 200 CAPSULE ORAL at 06:30

## 2020-01-11 RX ADMIN — Medication 175 MICROGRAM(S): at 05:54

## 2020-01-11 RX ADMIN — TRAMADOL HYDROCHLORIDE 100 MILLIGRAM(S): 50 TABLET ORAL at 20:30

## 2020-01-11 RX ADMIN — OXYCODONE HYDROCHLORIDE 5 MILLIGRAM(S): 5 TABLET ORAL at 14:42

## 2020-01-11 RX ADMIN — Medication 500 MILLIGRAM(S): at 17:38

## 2020-01-11 RX ADMIN — Medication 325 MILLIGRAM(S): at 12:04

## 2020-01-11 RX ADMIN — TRAMADOL HYDROCHLORIDE 100 MILLIGRAM(S): 50 TABLET ORAL at 13:04

## 2020-01-11 RX ADMIN — OXYCODONE HYDROCHLORIDE 5 MILLIGRAM(S): 5 TABLET ORAL at 21:35

## 2020-01-11 RX ADMIN — TRAMADOL HYDROCHLORIDE 100 MILLIGRAM(S): 50 TABLET ORAL at 04:43

## 2020-01-11 NOTE — PROGRESS NOTE ADULT - SUBJECTIVE AND OBJECTIVE BOX
Patient seen and examined at bedside. No acute events over night. No acute complaints at this time. Pain well controlled. Denies chest pain, shortness of breath, nausea or vomiting.         Vital Signs Last 24 Hrs  T(C): 36.8 (11 Jan 2020 07:58), Max: 36.8 (11 Jan 2020 07:58)  T(F): 98.2 (11 Jan 2020 07:58), Max: 98.2 (11 Jan 2020 07:58)  HR: 87 (11 Jan 2020 07:58) (87 - 98)  BP: 125/73 (11 Jan 2020 07:58) (111/72 - 130/76)  BP(mean): --  RR: 16 (11 Jan 2020 07:58) (16 - 16)  SpO2: 96% (11 Jan 2020 07:58) (94% - 96%)                 LABS:      Ca    8.5        10 Justin 2020 06:52          PE:    General: NAD, resting comfortably in bed  LLE:   Dressing C/D/I  SCDs present at bedside. Not on pt when examined. Replaced on him  Unilateral leg swelling in the thigh  Compartments soft and compressible  No calf tenderness bilaterally  +TA/EHL/FHL/GSC  SILT L2-S1  2+ DP/PT

## 2020-01-11 NOTE — PROGRESS NOTE ADULT - ASSESSMENT
Patient is a 49y Male s/p L TKA Stable POD 5    -Pt doing well this am.   -Plan for R TKA on 1/13/19  -Medical optimization for OR appreciated - Please continue to document regarding clearance/optimization for OR  -Ice/Elevate  -Incentive Spirometry  -Multimodal Analgesia   -Ppx ABX  -DVT PE ppx  -SCDs  -PT/OT OOB  -WBAT  -Discharge planning - pending R TKA  -Will discuss w/ attending and advise if plan changes

## 2020-01-11 NOTE — PROGRESS NOTE ADULT - SUBJECTIVE AND OBJECTIVE BOX
Patient is a 49y old  Male who presents with a chief complaint of knee replacement (10 Justin 2020 15:10)      INTERVAL HPI/OVERNIGHT EVENTS:  T(C): 36.9 (01-11-20 @ 15:21), Max: 36.9 (01-11-20 @ 15:21)  HR: 88 (01-11-20 @ 15:21) (87 - 98)  BP: 132/78 (01-11-20 @ 15:21) (111/72 - 132/78)  RR: 17 (01-11-20 @ 15:21) (16 - 17)  SpO2: 92% (01-11-20 @ 15:21) (92% - 96%)  Wt(kg): --  I&O's Summary    10 Justin 2020 07:01  -  11 Jan 2020 07:00  --------------------------------------------------------  IN: 300 mL / OUT: 0 mL / NET: 300 mL        PAST MEDICAL & SURGICAL HISTORY:  Unilateral primary osteoarthritis, left knee  Unilateral primary osteoarthritis, unspecified knee  HTN (hypertension)  Attention deficit disorder  Unilateral inguinal hernia with obstruction and without gangrene, recurrence not specified  HLD (hyperlipidemia)  Hypothyroidism  H/O right knee surgery: &lt; 3 years ago  H/O left knee surgery: w/in the year      SOCIAL HISTORY  Alcohol:  Tobacco:  Illicit substance use:      FAMILY HISTORY:      LABS:                        12.5   8.94  )-----------( 231      ( 10 Justin 2020 06:52 )             36.4     01-10    139  |  105  |  21  ----------------------------<  105<H>  3.9   |  27  |  0.92    Ca    8.5      10 Justin 2020 06:52          CAPILLARY BLOOD GLUCOSE                MEDICATIONS  (STANDING):  amphetamine/dextroamphetamine 20 milliGRAM(s) Oral daily  ascorbic acid 500 milliGRAM(s) Oral two times a day  atorvastatin 40 milliGRAM(s) Oral at bedtime  celecoxib 200 milliGRAM(s) Oral two times a day  enoxaparin Injectable 40 milliGRAM(s) SubCutaneous daily  ferrous    sulfate 325 milliGRAM(s) Oral three times a day with meals  folic acid 1 milliGRAM(s) Oral daily  levothyroxine 175 MICROGram(s) Oral daily  lisinopril 20 milliGRAM(s) Oral daily  multivitamin 1 Tablet(s) Oral daily  ondansetron Injectable 4 milliGRAM(s) IV Push once  pantoprazole    Tablet 40 milliGRAM(s) Oral before breakfast    MEDICATIONS  (PRN):  acetaminophen   Tablet .. 1000 milliGRAM(s) Oral every 6 hours PRN Mild Pain (1 - 3)  benzocaine 15 mG/menthol 3.6 mG (Sugar-Free) Lozenge 1 Lozenge Oral every 4 hours PRN Sore Throat  benzocaine 20% Spray 1 Spray(s) Mucosal every 6 hours PRN sore throat  bisacodyl 5 milliGRAM(s) Oral every 12 hours PRN Constipation  ondansetron Injectable 4 milliGRAM(s) IV Push every 6 hours PRN Nausea and/or Vomiting  oxyCODONE    IR 5 milliGRAM(s) Oral every 6 hours PRN Severe Pain (7 - 10)  senna 2 Tablet(s) Oral at bedtime PRN Constipation  traMADol 100 milliGRAM(s) Oral every 6 hours PRN Severe Pain (7 - 10)  traMADol 50 milliGRAM(s) Oral every 4 hours PRN Moderate Pain (4 - 6)      REVIEW OF SYSTEMS:  CONSTITUTIONAL: No fever, weight loss, or fatigue  EYES: No eye pain, visual disturbances, or discharge  ENMT:  No difficulty hearing, tinnitus, vertigo; No sinus or throat pain  NECK: No pain or stiffness  RESPIRATORY: No cough, wheezing, chills or hemoptysis; No shortness of breath  CARDIOVASCULAR: No chest pain, palpitations, dizziness, or leg swelling  GASTROINTESTINAL: No abdominal or epigastric pain. No nausea, vomiting, or hematemesis; No diarrhea or constipation. No melena or hematochezia.    MUSCULOSKELETAL: minor pain at the operatedsite      RADIOLOGY & ADDITIONAL TESTS:    Imaging Personally Reviewed:  [ ] YES  [ ] NO    Consultant(s) Notes Reviewed:  [ ] YES  [ ] NO    PHYSICAL EXAM:  GENERAL: NAD, well-groomed, well-developed    HEART: Regular rate and rhythm; No murmurs, rubs, or gallops  ABDOMEN: Soft, Nontender, Nondistended; Bowel sounds present  EXTREMITIES swelling of the left knee at the operated area.    Care Discussed with Consultants/Other Providers [ ] YES  [ ] NO

## 2020-01-12 ENCOUNTER — TRANSCRIPTION ENCOUNTER (OUTPATIENT)
Age: 50
End: 2020-01-12

## 2020-01-12 LAB
ANION GAP SERPL CALC-SCNC: 5 MMOL/L — SIGNIFICANT CHANGE UP (ref 5–17)
BUN SERPL-MCNC: 23 MG/DL — SIGNIFICANT CHANGE UP (ref 7–23)
CALCIUM SERPL-MCNC: 8.8 MG/DL — SIGNIFICANT CHANGE UP (ref 8.5–10.1)
CHLORIDE SERPL-SCNC: 106 MMOL/L — SIGNIFICANT CHANGE UP (ref 96–108)
CO2 SERPL-SCNC: 29 MMOL/L — SIGNIFICANT CHANGE UP (ref 22–31)
CREAT SERPL-MCNC: 0.98 MG/DL — SIGNIFICANT CHANGE UP (ref 0.5–1.3)
GLUCOSE SERPL-MCNC: 94 MG/DL — SIGNIFICANT CHANGE UP (ref 70–99)
HCT VFR BLD CALC: 33.9 % — LOW (ref 39–50)
HGB BLD-MCNC: 11.3 G/DL — LOW (ref 13–17)
MCHC RBC-ENTMCNC: 29.6 PG — SIGNIFICANT CHANGE UP (ref 27–34)
MCHC RBC-ENTMCNC: 33.3 GM/DL — SIGNIFICANT CHANGE UP (ref 32–36)
MCV RBC AUTO: 88.7 FL — SIGNIFICANT CHANGE UP (ref 80–100)
NRBC # BLD: 0 /100 WBCS — SIGNIFICANT CHANGE UP (ref 0–0)
PLATELET # BLD AUTO: 286 K/UL — SIGNIFICANT CHANGE UP (ref 150–400)
POTASSIUM SERPL-MCNC: 4.7 MMOL/L — SIGNIFICANT CHANGE UP (ref 3.5–5.3)
POTASSIUM SERPL-SCNC: 4.7 MMOL/L — SIGNIFICANT CHANGE UP (ref 3.5–5.3)
RBC # BLD: 3.82 M/UL — LOW (ref 4.2–5.8)
RBC # FLD: 12.6 % — SIGNIFICANT CHANGE UP (ref 10.3–14.5)
SODIUM SERPL-SCNC: 140 MMOL/L — SIGNIFICANT CHANGE UP (ref 135–145)
WBC # BLD: 9.25 K/UL — SIGNIFICANT CHANGE UP (ref 3.8–10.5)
WBC # FLD AUTO: 9.25 K/UL — SIGNIFICANT CHANGE UP (ref 3.8–10.5)

## 2020-01-12 RX ORDER — SODIUM CHLORIDE 9 MG/ML
1000 INJECTION, SOLUTION INTRAVENOUS
Refills: 0 | Status: DISCONTINUED | OUTPATIENT
Start: 2020-01-12 | End: 2020-01-13

## 2020-01-12 RX ADMIN — TRAMADOL HYDROCHLORIDE 100 MILLIGRAM(S): 50 TABLET ORAL at 01:54

## 2020-01-12 RX ADMIN — OXYCODONE HYDROCHLORIDE 5 MILLIGRAM(S): 5 TABLET ORAL at 23:40

## 2020-01-12 RX ADMIN — PANTOPRAZOLE SODIUM 40 MILLIGRAM(S): 20 TABLET, DELAYED RELEASE ORAL at 06:03

## 2020-01-12 RX ADMIN — OXYCODONE HYDROCHLORIDE 5 MILLIGRAM(S): 5 TABLET ORAL at 03:35

## 2020-01-12 RX ADMIN — ENOXAPARIN SODIUM 40 MILLIGRAM(S): 100 INJECTION SUBCUTANEOUS at 11:01

## 2020-01-12 RX ADMIN — TRAMADOL HYDROCHLORIDE 100 MILLIGRAM(S): 50 TABLET ORAL at 22:27

## 2020-01-12 RX ADMIN — Medication 325 MILLIGRAM(S): at 11:01

## 2020-01-12 RX ADMIN — CELECOXIB 200 MILLIGRAM(S): 200 CAPSULE ORAL at 06:03

## 2020-01-12 RX ADMIN — Medication 325 MILLIGRAM(S): at 08:49

## 2020-01-12 RX ADMIN — Medication 175 MICROGRAM(S): at 06:03

## 2020-01-12 RX ADMIN — TRAMADOL HYDROCHLORIDE 100 MILLIGRAM(S): 50 TABLET ORAL at 02:45

## 2020-01-12 RX ADMIN — TRAMADOL HYDROCHLORIDE 100 MILLIGRAM(S): 50 TABLET ORAL at 15:30

## 2020-01-12 RX ADMIN — OXYCODONE HYDROCHLORIDE 5 MILLIGRAM(S): 5 TABLET ORAL at 17:20

## 2020-01-12 RX ADMIN — Medication 1 MILLIGRAM(S): at 11:01

## 2020-01-12 RX ADMIN — Medication 1 TABLET(S): at 11:02

## 2020-01-12 RX ADMIN — TRAMADOL HYDROCHLORIDE 100 MILLIGRAM(S): 50 TABLET ORAL at 08:50

## 2020-01-12 RX ADMIN — ATORVASTATIN CALCIUM 40 MILLIGRAM(S): 80 TABLET, FILM COATED ORAL at 22:26

## 2020-01-12 RX ADMIN — Medication 500 MILLIGRAM(S): at 17:19

## 2020-01-12 RX ADMIN — Medication 325 MILLIGRAM(S): at 17:19

## 2020-01-12 RX ADMIN — Medication 500 MILLIGRAM(S): at 06:03

## 2020-01-12 RX ADMIN — CELECOXIB 200 MILLIGRAM(S): 200 CAPSULE ORAL at 17:19

## 2020-01-12 RX ADMIN — OXYCODONE HYDROCHLORIDE 5 MILLIGRAM(S): 5 TABLET ORAL at 04:15

## 2020-01-12 RX ADMIN — OXYCODONE HYDROCHLORIDE 5 MILLIGRAM(S): 5 TABLET ORAL at 11:02

## 2020-01-12 RX ADMIN — LISINOPRIL 20 MILLIGRAM(S): 2.5 TABLET ORAL at 06:03

## 2020-01-12 RX ADMIN — TRAMADOL HYDROCHLORIDE 100 MILLIGRAM(S): 50 TABLET ORAL at 23:25

## 2020-01-12 NOTE — PROGRESS NOTE ADULT - SUBJECTIVE AND OBJECTIVE BOX
Patient is a 49y old  Male who presents with a chief complaint of total knee replacement (11 Jan 2020 15:57)      INTERVAL HPI/OVERNIGHT EVENTS:  T(C): 36.8 (01-12-20 @ 07:19), Max: 36.9 (01-11-20 @ 15:21)  HR: 84 (01-12-20 @ 07:19) (79 - 93)  BP: 123/78 (01-12-20 @ 07:19) (122/78 - 132/78)  RR: 17 (01-12-20 @ 07:19) (17 - 17)  SpO2: 99% (01-12-20 @ 07:19) (92% - 99%)  Wt(kg): --  I&O's Summary      PAST MEDICAL & SURGICAL HISTORY:  Unilateral primary osteoarthritis, left knee  Unilateral primary osteoarthritis, unspecified knee  HTN (hypertension)  Attention deficit disorder  Unilateral inguinal hernia with obstruction and without gangrene, recurrence not specified  HLD (hyperlipidemia)  Hypothyroidism  H/O right knee surgery: &lt; 3 years ago  H/O left knee surgery: w/in the year      SOCIAL HISTORY  Alcohol:  Tobacco:  Illicit substance use:      FAMILY HISTORY:      LABS:                        11.3   9.25  )-----------( 286      ( 12 Jan 2020 06:24 )             33.9     01-12    140  |  106  |  23  ----------------------------<  94  4.7   |  29  |  0.98    Ca    8.8      12 Jan 2020 06:24          CAPILLARY BLOOD GLUCOSE                MEDICATIONS  (STANDING):  amphetamine/dextroamphetamine 20 milliGRAM(s) Oral daily  ascorbic acid 500 milliGRAM(s) Oral two times a day  atorvastatin 40 milliGRAM(s) Oral at bedtime  celecoxib 200 milliGRAM(s) Oral two times a day  enoxaparin Injectable 40 milliGRAM(s) SubCutaneous daily  ferrous    sulfate 325 milliGRAM(s) Oral three times a day with meals  folic acid 1 milliGRAM(s) Oral daily  lactated ringers. 1000 milliLiter(s) (75 mL/Hr) IV Continuous <Continuous>  levothyroxine 175 MICROGram(s) Oral daily  lisinopril 20 milliGRAM(s) Oral daily  multivitamin 1 Tablet(s) Oral daily  ondansetron Injectable 4 milliGRAM(s) IV Push once  pantoprazole    Tablet 40 milliGRAM(s) Oral before breakfast    MEDICATIONS  (PRN):  acetaminophen   Tablet .. 1000 milliGRAM(s) Oral every 6 hours PRN Mild Pain (1 - 3)  benzocaine 15 mG/menthol 3.6 mG (Sugar-Free) Lozenge 1 Lozenge Oral every 4 hours PRN Sore Throat  benzocaine 20% Spray 1 Spray(s) Mucosal every 6 hours PRN sore throat  bisacodyl 5 milliGRAM(s) Oral every 12 hours PRN Constipation  ondansetron Injectable 4 milliGRAM(s) IV Push every 6 hours PRN Nausea and/or Vomiting  oxyCODONE    IR 5 milliGRAM(s) Oral every 6 hours PRN Severe Pain (7 - 10)  senna 2 Tablet(s) Oral at bedtime PRN Constipation  traMADol 100 milliGRAM(s) Oral every 6 hours PRN Severe Pain (7 - 10)  traMADol 50 milliGRAM(s) Oral every 4 hours PRN Moderate Pain (4 - 6)      REVIEW OF SYSTEMS:  CONSTITUTIONAL: No fever, weight loss, or fatigue  EYES: No eye pain, visual disturbances, or discharge  ENMT:  No difficulty hearing, tinnitus, vertigo; No sinus or throat pain  NECK: No pain or stiffness  RESPIRATORY: No cough, wheezing, chills or hemoptysis; No shortness of breath  CARDIOVASCULAR: No chest pain, palpitations, dizziness, or leg swelling  GASTROINTESTINAL: No abdominal or epigastric pain. No nausea, vomiting, or hematemesis; No diarrhea or constipation. No melena or hematochezia.  GENITOURINARY: No dysuria, frequency, hematuria, or incontinence  NEUROLOGICAL: No headaches, memory loss, loss of strength, numbness, or tremors  MUSCULOSKELETAL: No joint pain or swelling; No muscle, back, or extremity pain    RADIOLOGY & ADDITIONAL TESTS:    Imaging Personally Reviewed:  [ ] YES  [ ] NO    Consultant(s) Notes Reviewed:  [ ] YES  [ ] NO    PHYSICAL EXAM:  GENERAL: NAD, well-groomed, well-developed  NERVOUS SYSTEM:  Alert & Oriented X3, Good concentration; Motor Strength 5/5 B/L upper and lower extremities; DTRs 2+ intact and symmetric  CHEST/LUNG: Clear to percussion bilaterally; No rales, rhonchi, wheezing, or rubs  HEART: Regular rate and rhythm; No murmurs, rubs, or gallops  ABDOMEN: Soft, Nontender, Nondistended; Bowel sounds present  EXTREMITIES: swelling and bruising of the left knee ---operated site    Care Discussed with Consultants/Other Providers [ ] YES  [ ] NO

## 2020-01-12 NOTE — PROGRESS NOTE ADULT - SUBJECTIVE AND OBJECTIVE BOX
Patient seen and examined at bedside. No acute events over night. No acute complaints at this time. Pain well controlled. Patient has been ambulating with physical therapy and reports that the knee pain is improving. Denies chest pain, shortness of breath, nausea or vomiting.     PE:  Vital Signs Last 24 Hrs  T(C): 36.9 (01-11-20 @ 23:44), Max: 36.9 (01-11-20 @ 15:21)  T(F): 98.4 (01-11-20 @ 23:44), Max: 98.4 (01-11-20 @ 15:21)  HR: 79 (01-12-20 @ 06:02) (79 - 93)  BP: 122/78 (01-12-20 @ 06:02) (122/78 - 132/78)  BP(mean): --  RR: 17 (01-11-20 @ 23:44) (16 - 17)  SpO2: 97% (01-11-20 @ 23:44) (92% - 97%)    General: NAD, resting comfortably in bed  LLE:   Dressing C/D/I  SCDs present bilaterally  Compartments soft and compressible  No calf tenderness bilaterally  +TA/EHL/FHL/GSC  SILT L3-S1  2+ DP/PT                          11.3   9.25  )-----------( 286      ( 12 Jan 2020 06:24 )             33.9     12 Jan 2020 06:24    140    |  106    |  23     ----------------------------<  94     4.7     |  29     |  0.98     Ca    8.8        12 Jan 2020 06:24          A/P:  49y m s/p L TKA POD0, preop for R TKA 1/13  -PT/OT -WBAT B/L LE  -Pain Control  -DVT ppx lovenox - hold after midnight  -NPO after midnight, IVF fluids at midnight  -FU AM Labs  -Rest, ice, compress and elevate the extremity as we needed  -Incentive Spirometry  -Medical management appreciated    Ortho [phone]

## 2020-01-13 ENCOUNTER — RESULT REVIEW (OUTPATIENT)
Age: 50
End: 2020-01-13

## 2020-01-13 LAB
ANION GAP SERPL CALC-SCNC: 5 MMOL/L — SIGNIFICANT CHANGE UP (ref 5–17)
APTT BLD: 33.2 SEC — SIGNIFICANT CHANGE UP (ref 28.5–37)
BUN SERPL-MCNC: 24 MG/DL — HIGH (ref 7–23)
CALCIUM SERPL-MCNC: 8.4 MG/DL — LOW (ref 8.5–10.1)
CHLORIDE SERPL-SCNC: 106 MMOL/L — SIGNIFICANT CHANGE UP (ref 96–108)
CO2 SERPL-SCNC: 29 MMOL/L — SIGNIFICANT CHANGE UP (ref 22–31)
CREAT SERPL-MCNC: 0.96 MG/DL — SIGNIFICANT CHANGE UP (ref 0.5–1.3)
GLUCOSE SERPL-MCNC: 107 MG/DL — HIGH (ref 70–99)
HCT VFR BLD CALC: 33.1 % — LOW (ref 39–50)
HCT VFR BLD CALC: 35.4 % — LOW (ref 39–50)
HGB BLD-MCNC: 11.2 G/DL — LOW (ref 13–17)
HGB BLD-MCNC: 11.7 G/DL — LOW (ref 13–17)
INR BLD: 1.14 RATIO — SIGNIFICANT CHANGE UP (ref 0.88–1.16)
MCHC RBC-ENTMCNC: 29.6 PG — SIGNIFICANT CHANGE UP (ref 27–34)
MCHC RBC-ENTMCNC: 29.9 PG — SIGNIFICANT CHANGE UP (ref 27–34)
MCHC RBC-ENTMCNC: 33.1 GM/DL — SIGNIFICANT CHANGE UP (ref 32–36)
MCHC RBC-ENTMCNC: 33.8 GM/DL — SIGNIFICANT CHANGE UP (ref 32–36)
MCV RBC AUTO: 88.5 FL — SIGNIFICANT CHANGE UP (ref 80–100)
MCV RBC AUTO: 89.6 FL — SIGNIFICANT CHANGE UP (ref 80–100)
NRBC # BLD: 0 /100 WBCS — SIGNIFICANT CHANGE UP (ref 0–0)
NRBC # BLD: 0 /100 WBCS — SIGNIFICANT CHANGE UP (ref 0–0)
PLATELET # BLD AUTO: 270 K/UL — SIGNIFICANT CHANGE UP (ref 150–400)
PLATELET # BLD AUTO: 322 K/UL — SIGNIFICANT CHANGE UP (ref 150–400)
POTASSIUM SERPL-MCNC: 4.2 MMOL/L — SIGNIFICANT CHANGE UP (ref 3.5–5.3)
POTASSIUM SERPL-SCNC: 4.2 MMOL/L — SIGNIFICANT CHANGE UP (ref 3.5–5.3)
PROTHROM AB SERPL-ACNC: 12.9 SEC — SIGNIFICANT CHANGE UP (ref 10–12.9)
RBC # BLD: 3.74 M/UL — LOW (ref 4.2–5.8)
RBC # BLD: 3.95 M/UL — LOW (ref 4.2–5.8)
RBC # FLD: 12.4 % — SIGNIFICANT CHANGE UP (ref 10.3–14.5)
RBC # FLD: 12.5 % — SIGNIFICANT CHANGE UP (ref 10.3–14.5)
SODIUM SERPL-SCNC: 140 MMOL/L — SIGNIFICANT CHANGE UP (ref 135–145)
WBC # BLD: 13.54 K/UL — HIGH (ref 3.8–10.5)
WBC # BLD: 8.95 K/UL — SIGNIFICANT CHANGE UP (ref 3.8–10.5)
WBC # FLD AUTO: 13.54 K/UL — HIGH (ref 3.8–10.5)
WBC # FLD AUTO: 8.95 K/UL — SIGNIFICANT CHANGE UP (ref 3.8–10.5)

## 2020-01-13 PROCEDURE — 73562 X-RAY EXAM OF KNEE 3: CPT | Mod: 26,RT

## 2020-01-13 PROCEDURE — 88311 DECALCIFY TISSUE: CPT | Mod: 26

## 2020-01-13 PROCEDURE — 88305 TISSUE EXAM BY PATHOLOGIST: CPT | Mod: 26

## 2020-01-13 RX ORDER — OXYCODONE HYDROCHLORIDE 5 MG/1
10 TABLET ORAL EVERY 6 HOURS
Refills: 0 | Status: DISCONTINUED | OUTPATIENT
Start: 2020-01-13 | End: 2020-01-16

## 2020-01-13 RX ORDER — ASCORBIC ACID 60 MG
500 TABLET,CHEWABLE ORAL
Refills: 0 | Status: DISCONTINUED | OUTPATIENT
Start: 2020-01-13 | End: 2020-01-16

## 2020-01-13 RX ORDER — HYDROMORPHONE HYDROCHLORIDE 2 MG/ML
0.5 INJECTION INTRAMUSCULAR; INTRAVENOUS; SUBCUTANEOUS
Refills: 0 | Status: DISCONTINUED | OUTPATIENT
Start: 2020-01-13 | End: 2020-01-13

## 2020-01-13 RX ORDER — ACETAMINOPHEN 500 MG
1000 TABLET ORAL ONCE
Refills: 0 | Status: COMPLETED | OUTPATIENT
Start: 2020-01-13 | End: 2020-01-13

## 2020-01-13 RX ORDER — HYDROMORPHONE HYDROCHLORIDE 2 MG/ML
2 INJECTION INTRAMUSCULAR; INTRAVENOUS; SUBCUTANEOUS EVERY 4 HOURS
Refills: 0 | Status: DISCONTINUED | OUTPATIENT
Start: 2020-01-13 | End: 2020-01-16

## 2020-01-13 RX ORDER — VANCOMYCIN HCL 1 G
1750 VIAL (EA) INTRAVENOUS EVERY 12 HOURS
Refills: 0 | Status: COMPLETED | OUTPATIENT
Start: 2020-01-13 | End: 2020-01-13

## 2020-01-13 RX ORDER — LEVOTHYROXINE SODIUM 125 MCG
175 TABLET ORAL DAILY
Refills: 0 | Status: DISCONTINUED | OUTPATIENT
Start: 2020-01-13 | End: 2020-01-16

## 2020-01-13 RX ORDER — SENNA PLUS 8.6 MG/1
2 TABLET ORAL ONCE
Refills: 0 | Status: COMPLETED | OUTPATIENT
Start: 2020-01-13 | End: 2020-01-13

## 2020-01-13 RX ORDER — CELECOXIB 200 MG/1
200 CAPSULE ORAL
Refills: 0 | Status: DISCONTINUED | OUTPATIENT
Start: 2020-01-13 | End: 2020-01-16

## 2020-01-13 RX ORDER — ONDANSETRON 8 MG/1
4 TABLET, FILM COATED ORAL EVERY 6 HOURS
Refills: 0 | Status: DISCONTINUED | OUTPATIENT
Start: 2020-01-13 | End: 2020-01-16

## 2020-01-13 RX ORDER — FERROUS SULFATE 325(65) MG
325 TABLET ORAL
Refills: 0 | Status: DISCONTINUED | OUTPATIENT
Start: 2020-01-13 | End: 2020-01-16

## 2020-01-13 RX ORDER — BENZOCAINE AND MENTHOL 5; 1 G/100ML; G/100ML
1 LIQUID ORAL EVERY 4 HOURS
Refills: 0 | Status: DISCONTINUED | OUTPATIENT
Start: 2020-01-13 | End: 2020-01-16

## 2020-01-13 RX ORDER — LISINOPRIL 2.5 MG/1
20 TABLET ORAL DAILY
Refills: 0 | Status: DISCONTINUED | OUTPATIENT
Start: 2020-01-13 | End: 2020-01-16

## 2020-01-13 RX ORDER — OXYCODONE HYDROCHLORIDE 5 MG/1
5 TABLET ORAL EVERY 4 HOURS
Refills: 0 | Status: DISCONTINUED | OUTPATIENT
Start: 2020-01-13 | End: 2020-01-16

## 2020-01-13 RX ORDER — BUPIVACAINE 13.3 MG/ML
20 INJECTION, SUSPENSION, LIPOSOMAL INFILTRATION ONCE
Refills: 0 | Status: DISCONTINUED | OUTPATIENT
Start: 2020-01-13 | End: 2020-01-13

## 2020-01-13 RX ORDER — RIVAROXABAN 15 MG-20MG
10 KIT ORAL DAILY
Refills: 0 | Status: DISCONTINUED | OUTPATIENT
Start: 2020-01-14 | End: 2020-01-16

## 2020-01-13 RX ORDER — TRAMADOL HYDROCHLORIDE 50 MG/1
50 TABLET ORAL EVERY 4 HOURS
Refills: 0 | Status: DISCONTINUED | OUTPATIENT
Start: 2020-01-13 | End: 2020-01-13

## 2020-01-13 RX ORDER — ACETAMINOPHEN 500 MG
1000 TABLET ORAL ONCE
Refills: 0 | Status: COMPLETED | OUTPATIENT
Start: 2020-01-14 | End: 2020-01-14

## 2020-01-13 RX ORDER — VANCOMYCIN HCL 1 G
1750 VIAL (EA) INTRAVENOUS ONCE
Refills: 0 | Status: COMPLETED | OUTPATIENT
Start: 2020-01-13 | End: 2020-01-13

## 2020-01-13 RX ORDER — SODIUM CHLORIDE 9 MG/ML
1000 INJECTION, SOLUTION INTRAVENOUS
Refills: 0 | Status: DISCONTINUED | OUTPATIENT
Start: 2020-01-13 | End: 2020-01-13

## 2020-01-13 RX ORDER — TRAMADOL HYDROCHLORIDE 50 MG/1
50 TABLET ORAL EVERY 6 HOURS
Refills: 0 | Status: DISCONTINUED | OUTPATIENT
Start: 2020-01-13 | End: 2020-01-16

## 2020-01-13 RX ORDER — PANTOPRAZOLE SODIUM 20 MG/1
40 TABLET, DELAYED RELEASE ORAL
Refills: 0 | Status: DISCONTINUED | OUTPATIENT
Start: 2020-01-13 | End: 2020-01-16

## 2020-01-13 RX ORDER — ONDANSETRON 8 MG/1
4 TABLET, FILM COATED ORAL ONCE
Refills: 0 | Status: DISCONTINUED | OUTPATIENT
Start: 2020-01-13 | End: 2020-01-13

## 2020-01-13 RX ORDER — DEXTROAMPHETAMINE SACCHARATE, AMPHETAMINE ASPARTATE, DEXTROAMPHETAMINE SULFATE AND AMPHETAMINE SULFATE 1.875; 1.875; 1.875; 1.875 MG/1; MG/1; MG/1; MG/1
20 TABLET ORAL DAILY
Refills: 0 | Status: DISCONTINUED | OUTPATIENT
Start: 2020-01-13 | End: 2020-01-16

## 2020-01-13 RX ORDER — FOLIC ACID 0.8 MG
1 TABLET ORAL DAILY
Refills: 0 | Status: DISCONTINUED | OUTPATIENT
Start: 2020-01-13 | End: 2020-01-16

## 2020-01-13 RX ORDER — ATORVASTATIN CALCIUM 80 MG/1
40 TABLET, FILM COATED ORAL AT BEDTIME
Refills: 0 | Status: DISCONTINUED | OUTPATIENT
Start: 2020-01-13 | End: 2020-01-16

## 2020-01-13 RX ORDER — TRAMADOL HYDROCHLORIDE 50 MG/1
100 TABLET ORAL EVERY 8 HOURS
Refills: 0 | Status: DISCONTINUED | OUTPATIENT
Start: 2020-01-13 | End: 2020-01-13

## 2020-01-13 RX ADMIN — Medication 325 MILLIGRAM(S): at 18:45

## 2020-01-13 RX ADMIN — ATORVASTATIN CALCIUM 40 MILLIGRAM(S): 80 TABLET, FILM COATED ORAL at 20:57

## 2020-01-13 RX ADMIN — OXYCODONE HYDROCHLORIDE 10 MILLIGRAM(S): 5 TABLET ORAL at 20:57

## 2020-01-13 RX ADMIN — Medication 175 MICROGRAM(S): at 05:14

## 2020-01-13 RX ADMIN — OXYCODONE HYDROCHLORIDE 10 MILLIGRAM(S): 5 TABLET ORAL at 15:59

## 2020-01-13 RX ADMIN — BENZOCAINE AND MENTHOL 1 LOZENGE: 5; 1 LIQUID ORAL at 13:23

## 2020-01-13 RX ADMIN — Medication 500 MILLIGRAM(S): at 18:45

## 2020-01-13 RX ADMIN — CELECOXIB 200 MILLIGRAM(S): 200 CAPSULE ORAL at 18:45

## 2020-01-13 RX ADMIN — Medication 400 MILLIGRAM(S): at 10:22

## 2020-01-13 RX ADMIN — SENNA PLUS 2 TABLET(S): 8.6 TABLET ORAL at 13:19

## 2020-01-13 RX ADMIN — HYDROMORPHONE HYDROCHLORIDE 2 MILLIGRAM(S): 2 INJECTION INTRAMUSCULAR; INTRAVENOUS; SUBCUTANEOUS at 18:19

## 2020-01-13 RX ADMIN — OXYCODONE HYDROCHLORIDE 10 MILLIGRAM(S): 5 TABLET ORAL at 21:56

## 2020-01-13 RX ADMIN — Medication 400 MILLIGRAM(S): at 18:21

## 2020-01-13 RX ADMIN — Medication 1000 MILLIGRAM(S): at 18:51

## 2020-01-13 RX ADMIN — Medication 1 MILLIGRAM(S): at 13:19

## 2020-01-13 RX ADMIN — Medication 250 MILLIGRAM(S): at 19:12

## 2020-01-13 RX ADMIN — LISINOPRIL 20 MILLIGRAM(S): 2.5 TABLET ORAL at 05:14

## 2020-01-13 RX ADMIN — Medication 325 MILLIGRAM(S): at 13:19

## 2020-01-13 RX ADMIN — SODIUM CHLORIDE 75 MILLILITER(S): 9 INJECTION, SOLUTION INTRAVENOUS at 10:22

## 2020-01-13 RX ADMIN — TRAMADOL HYDROCHLORIDE 100 MILLIGRAM(S): 50 TABLET ORAL at 14:20

## 2020-01-13 RX ADMIN — HYDROMORPHONE HYDROCHLORIDE 2 MILLIGRAM(S): 2 INJECTION INTRAMUSCULAR; INTRAVENOUS; SUBCUTANEOUS at 18:51

## 2020-01-13 RX ADMIN — SODIUM CHLORIDE 75 MILLILITER(S): 9 INJECTION, SOLUTION INTRAVENOUS at 00:07

## 2020-01-13 RX ADMIN — Medication 1000 MILLIGRAM(S): at 10:52

## 2020-01-13 RX ADMIN — CELECOXIB 200 MILLIGRAM(S): 200 CAPSULE ORAL at 18:46

## 2020-01-13 RX ADMIN — OXYCODONE HYDROCHLORIDE 10 MILLIGRAM(S): 5 TABLET ORAL at 14:59

## 2020-01-13 RX ADMIN — PANTOPRAZOLE SODIUM 40 MILLIGRAM(S): 20 TABLET, DELAYED RELEASE ORAL at 05:14

## 2020-01-13 RX ADMIN — TRAMADOL HYDROCHLORIDE 100 MILLIGRAM(S): 50 TABLET ORAL at 13:20

## 2020-01-13 NOTE — OCCUPATIONAL THERAPY INITIAL EVALUATION ADULT - TRANSFER TRAINING, PT EVAL
Patient will transfer to toilet with DME as needed with supervision in 2-4 sessions.
Patient will transfer to toilet with DME as needed with supervision in 2-4 sessions.

## 2020-01-13 NOTE — OCCUPATIONAL THERAPY INITIAL EVALUATION ADULT - PERTINENT HX OF CURRENT PROBLEM, REHAB EVAL
50 y/o male s/p Stage II Right TKR on 1/13/2020 and Stage I Left TKR on 1/6/20 by Dr. Partida. Pt became diaphoretic while seated on EOB; patient assisted back to supine in bed and vital signs taken and were stable (bp 104/69, HR 78, O2 % on room air).

## 2020-01-13 NOTE — BRIEF OPERATIVE NOTE - NSICDXBRIEFPOSTOP_GEN_ALL_CORE_FT
POST-OP DIAGNOSIS:  Primary osteoarthritis of left knee 06-Jan-2020 10:52:45  Jose Morrissey
POST-OP DIAGNOSIS:  Primary osteoarthritis of right knee 13-Jan-2020 09:47:17  Allison Vega

## 2020-01-13 NOTE — PHYSICAL THERAPY INITIAL EVALUATION ADULT - ADDITIONAL COMMENTS
Pt reports that he lives in a two-family home. He and his significant other (Ena, present at bedside) live on the top floor. Pt's two sons live on the ground level. He reports [address] with railings, and one flight of stairs to his apartment with no railings. There is a bedroom, bathroom, and kitchen upstairs. He states that he can stay with his sons if he has to, but would prefer to be able to get up to his own apartment. He states that he was independent in dressing and bathing, and does not own any assistive device. He has a walk-in shower with a grab bar as well as a bathtub. He rec'd his L TKR at  on 1/06/2020 and has shown good progress towards functional PT goals. He has expressed a desire to go to [facility], but goal of PT is to return home with PT services.

## 2020-01-13 NOTE — OCCUPATIONAL THERAPY INITIAL EVALUATION ADULT - ADL RETRAINING, OT EVAL
Patient will dress lower body with set-up, AE as needed in 2-4 sessions.
Patient will dress lower body with set-up, AE as needed in 2-4 sessions.

## 2020-01-13 NOTE — OCCUPATIONAL THERAPY INITIAL EVALUATION ADULT - NS ASR FOLLOW COMMAND OT EVAL
able to follow multistep instructions/100% of the time
100% of the time/able to follow multistep instructions

## 2020-01-13 NOTE — PROGRESS NOTE ADULT - SUBJECTIVE AND OBJECTIVE BOX
Patient is a 49y old  Male who presents with a chief complaint of tkr (12 Jan 2020 09:19)  s/p staged bilateral tkr  mild post op discomfort      INTERVAL HPI/OVERNIGHT EVENTS:  T(C): 36.5 (01-13-20 @ 11:41), Max: 37 (01-12-20 @ 15:24)  HR: 89 (01-13-20 @ 11:41) (72 - 101)  BP: 121/74 (01-13-20 @ 11:41) (77/46 - 146/82)  RR: 17 (01-13-20 @ 11:41) (13 - 17)  SpO2: 93% (01-13-20 @ 11:41) (93% - 99%)  Wt(kg): --  I&O's Summary    12 Jan 2020 07:01  -  13 Jan 2020 07:00  --------------------------------------------------------  IN: 0 mL / OUT: 900 mL / NET: -900 mL    13 Jan 2020 07:01  -  13 Jan 2020 14:43  --------------------------------------------------------  IN: 240 mL / OUT: 0 mL / NET: 240 mL        LABS:                        11.2   13.54 )-----------( 270      ( 13 Jan 2020 10:32 )             33.1     01-13    140  |  106  |  24<H>  ----------------------------<  107<H>  4.2   |  29  |  0.96    Ca    8.4<L>      13 Jan 2020 05:05      PT/INR - ( 13 Jan 2020 05:05 )   PT: 12.9 sec;   INR: 1.14 ratio         PTT - ( 13 Jan 2020 05:05 )  PTT:33.2 sec    CAPILLARY BLOOD GLUCOSE      POCT Blood Glucose.: 122 mg/dL (13 Jan 2020 09:39)            MEDICATIONS  (STANDING):  acetaminophen  IVPB .. 1000 milliGRAM(s) IV Intermittent once  amphetamine/dextroamphetamine 20 milliGRAM(s) Oral daily  ascorbic acid 500 milliGRAM(s) Oral two times a day  atorvastatin 40 milliGRAM(s) Oral at bedtime  celecoxib 200 milliGRAM(s) Oral two times a day  ferrous    sulfate 325 milliGRAM(s) Oral three times a day with meals  folic acid 1 milliGRAM(s) Oral daily  levothyroxine 175 MICROGram(s) Oral daily  lisinopril 20 milliGRAM(s) Oral daily  pantoprazole    Tablet 40 milliGRAM(s) Oral before breakfast  vancomycin  IVPB 1750 milliGRAM(s) IV Intermittent every 12 hours    MEDICATIONS  (PRN):  acetaminophen   Tablet .. 1000 milliGRAM(s) Oral every 6 hours PRN Mild Pain (1 - 3)  benzocaine 15 mG/menthol 3.6 mG (Sugar-Free) Lozenge 1 Lozenge Oral every 4 hours PRN Sore Throat  bisacodyl 5 milliGRAM(s) Oral every 12 hours PRN Constipation  HYDROmorphone   Tablet 2 milliGRAM(s) Oral every 4 hours PRN BREAKTHROUGH Severe Pain 10  (7 - 10)  ondansetron Injectable 4 milliGRAM(s) IV Push every 6 hours PRN Nausea and/or Vomiting  oxyCODONE    IR 10 milliGRAM(s) Oral every 6 hours PRN Severe Pain (7 - 10)  oxyCODONE    IR 5 milliGRAM(s) Oral every 4 hours PRN Moderate Pain (4 - 6)      REVIEW OF SYSTEMS:  CONSTITUTIONAL: No fever, weight loss, or fatigue  EYES: No eye pain, visual disturbances, or discharge  ENMT:  No difficulty hearing, tinnitus, vertigo; No sinus or throat pain  NECK: No pain or stiffness  RESPIRATORY: No cough, wheezing, chills or hemoptysis; No shortness of breath  CARDIOVASCULAR: No chest pain, palpitations, dizziness, or leg swelling  GASTROINTESTINAL: No abdominal or epigastric pain. No nausea, vomiting, or hematemesis; No diarrhea or constipation. No melena or hematochezia.  GENITOURINARY: No dysuria, frequency, hematuria, or incontinence  NEUROLOGICAL: No headaches, memory loss, loss of strength, numbness, or tremors  SKIN: No itching, burning, rashes, or lesions   LYMPH NODES: No enlarged glands  ENDOCRINE: No heat or cold intolerance; No hair loss  MUSCULOSKELETAL: chronic bilateral knee pain  PSYCHIATRIC: No depression, anxiety, mood swings, or difficulty sleeping  HEME/LYMPH: No easy bruising, or bleeding gums  ALLERY AND IMMUNOLOGIC: No hives or eczema    RADIOLOGY & ADDITIONAL TESTS:    Imaging Personally Reviewed:  [ ] YES  [ ] NO    Consultant(s) Notes Reviewed:  [ ] YES  [ ] NO    PHYSICAL EXAM:  GENERAL: NAD, well-groomed, well-developed  HEAD:  Atraumatic, Normocephalic  EYES: EOMI, PERRLA, conjunctiva and sclera clear  ENMT: No tonsillar erythema, exudates, or enlargement; Moist mucous membranes, Good dentition, No lesions  NECK: Supple, No JVD, Normal thyroid  NERVOUS SYSTEM:  Alert & Oriented X3, Good concentration; Motor Strength 5/5 B/L upper and lower extremities; DTRs 2+ intact and symmetric  CHEST/LUNG: Clear to percussion bilaterally; No rales, rhonchi, wheezing, or rubs  HEART: Regular rate and rhythm; No murmurs, rubs, or gallops  ABDOMEN: Soft, Nontender, Nondistended; Bowel sounds present  EXTREMITIES:  2+ Peripheral Pulses, No clubbing, cyanosis, or edema  LYMPH: No lymphadenopathy noted  SKIN: No rashes or lesions    Care Discussed with Consultants/Other Providers [ ] YES  [ ] NO

## 2020-01-13 NOTE — OCCUPATIONAL THERAPY INITIAL EVALUATION ADULT - IADL RETRAINING, OT EVAL
Patient will increase standing tolerance to 5-7 minutes for grooming at the sink in 2-4 sessions.
Patient will increase standing tolerance to 5-7 minutes for grooming at the sink in 2-4 sessions.

## 2020-01-13 NOTE — PHYSICAL THERAPY INITIAL EVALUATION ADULT - IMPAIRMENTS FOUND, PT EVAL
ROM/gait, locomotion, and balance/gross motor/muscle strength
gait, locomotion, and balance/ROM/muscle strength

## 2020-01-13 NOTE — BRIEF OPERATIVE NOTE - NSICDXBRIEFPREOP_GEN_ALL_CORE_FT
PRE-OP DIAGNOSIS:  Osteoarthritis of left knee 06-Jan-2020 10:52:17  Jose Morrissey
PRE-OP DIAGNOSIS:  Primary osteoarthritis of right knee 13-Jan-2020 09:47:27  Allison Vega

## 2020-01-13 NOTE — PROGRESS NOTE ADULT - SUBJECTIVE AND OBJECTIVE BOX
Patient seen and examined at bedside. No acute events over night. No acute complaints at this time. Pain well controlled. Patient has been ambulating with physical therapy and reports that the knee pain is improving. Denies chest pain, shortness of breath, nausea or vomiting. Patient with some ecchymosis over his knee    PE:  Vital Signs Last 24 Hrs  T(C): 36.7 (13 Jan 2020 05:12), Max: 37 (12 Jan 2020 15:24)  T(F): 98.1 (13 Jan 2020 05:12), Max: 98.6 (12 Jan 2020 15:24)  HR: 90 (13 Jan 2020 05:12) (84 - 101)  BP: 146/82 (13 Jan 2020 05:12) (110/78 - 146/82)  BP(mean): --  RR: 16 (13 Jan 2020 05:12) (16 - 17)  SpO2: 99% (13 Jan 2020 05:12) (95% - 99%)    General: NAD, resting comfortably in bed  LLE:   Dressing C/D/I  SCDs present bilaterally  Compartments soft and compressible  Postoperative ecchymosis  No calf tenderness bilaterally  +TA/EHL/FHL/GSC  SILT L3-S1  2+ DP/PT                         A/P:  49y m s/p L TKA POD0, plan for OR R TKA 1/13  -PT/OT -WBAT B/L LE  -Pain Control  -DVT ppx lovenox - hold after midnight  -NPO after midnight, IVF fluids at midnight  -FU AM Labs  -Rest, ice, compress and elevate the extremity as we needed  -Incentive Spirometry  -Medical management appreciated    Ortho p.[id number]

## 2020-01-13 NOTE — PROGRESS NOTE ADULT - SUBJECTIVE AND OBJECTIVE BOX
VICTOR MANUEL RECINOS      49y   male  MRN-[id number]    ORTHOPEDIC SURGERY / DR. PUENTE    Doing well in pacu. No major complaints.     Vital Signs Last 24 Hrs  T(C): 36.7 (13 Jan 2020 15:49), Max: 36.7 (13 Jan 2020 04:14)  T(F): 98.1 (13 Jan 2020 15:49), Max: 98.1 (13 Jan 2020 04:14)  HR: 90 (13 Jan 2020 15:49) (72 - 101)  BP: 113/69 (13 Jan 2020 15:49) (77/46 - 146/82)  BP(mean): --  RR: 17 (13 Jan 2020 15:49) (13 - 17)  SpO2: 94% (13 Jan 2020 15:49) (93% - 99%)    LEFT KNEE :    WOUND DRY AND INTACT  +1 EDEMA, DIFFUSE ECCHYMOSIS MEDIAL AND LATERAL THIGH   GOOD MOTOR TO LEFT LOWER EXTREMITY  NEURO-VASCULAR STATUS INTACT  NO CALF TENDERNESS      right KNEE :    DSG CLEAN, DRY AND INTACT  GOOD MOTOR TO LEFT LOWER EXTREMITY  NEURO-VASCULAR STATUS INTACT  NO CALF TENDERNESS                        11.7   8.95  )-----------( 322      ( 13 Jan 2020 05:05 )             35.4       Hemoglobin: 11.3 (01-12 @ 06:24)  Hemoglobin: 12.5 (01-10 @ 06:52)    Hematocrit: 33.9 (01-12 @ 06:24)  Hematocrit: 36.4 (01-10 @ 06:52)    01-13    140  |  106  |  24<H>  ----------------------------<  107<H>  4.2   |  29  |  0.96    Ca    8.4<L>      13 Jan 2020 05:05    PT/INR - ( 13 Jan 2020 05:05 )   PT: 12.9 sec;   INR: 1.14 ratio    PTT - ( 13 Jan 2020 05:05 )  PTT:33.2 sec    Type + Screen (01.13.20 @ 05:05)    ABO RH Interpretation: A POS                           ASSESSMENT &  PLAN:  POD # 7 S/P LEFT TOTAL KNEE REPLACEMENT, POD #0 s/p R TKA    WEIGHT  BEARING AS TOLERATED, OOB AND AMBULATE, PHYSICAL THERAPY   NEW AQUACEL DRESSING APPLIED  to L knee this am  WBAT, OOB with PT  Dispo planning

## 2020-01-13 NOTE — PHYSICAL THERAPY INITIAL EVALUATION ADULT - PERTINENT HX OF CURRENT PROBLEM, REHAB EVAL
48 yo male with HTN, ADD, HLD and hypothyroidism reports bilateral knee pain secondary to bone on bone OA (left knee hurt more than right). Recently received L TKR (1/6/2020) at .  Currently presents with R TKR.

## 2020-01-13 NOTE — OCCUPATIONAL THERAPY INITIAL EVALUATION ADULT - ADDITIONAL COMMENTS
Pt reports that he lives in a two-family home. He and his significant other Ena live on the 2nd level and patient's two sons live on the ground level. There are 2 steps to enter with handrail and 13 steps with no handrail to access bedroom and bathroom. Pt has a stall shower. Pt does not own any DME. Pt ambulated using rolling walker and performed ADL's at a modified independent level s/p Stage I Left TKR on 1/6/2020).

## 2020-01-13 NOTE — OCCUPATIONAL THERAPY INITIAL EVALUATION ADULT - LEVEL OF INDEPENDENCE, OT EVAL
sponge bathing supine in bed with HOB elevated/maximum assist (25% patients effort)
moderate assist (50% patients effort)/sponge bathing

## 2020-01-13 NOTE — BRIEF OPERATIVE NOTE - NSICDXBRIEFPROCEDURE_GEN_ALL_CORE_FT
PROCEDURES:  Left total knee arthroplasty 06-Jan-2020 10:51:03  Jose Morrissey
PROCEDURES:  Total knee replacements 13-Jan-2020 09:47:56  Allison Vega

## 2020-01-13 NOTE — PROGRESS NOTE ADULT - SUBJECTIVE AND OBJECTIVE BOX
VICTOR MANUEL RECINOS      49y   male  [id number]    ORTHOPEDIC SURGERY / DR. PUENTE    POD # 7    Vital Signs Last 24 Hrs  T(C): 36.7 (13 Jan 2020 05:12), Max: 37 (12 Jan 2020 15:24)  T(F): 98.1 (13 Jan 2020 05:12), Max: 98.6 (12 Jan 2020 15:24)  HR: 90 (13 Jan 2020 05:12) (84 - 101)  BP: 146/82 (13 Jan 2020 05:12) (110/78 - 146/82)  BP(mean): --  RR: 16 (13 Jan 2020 05:12) (16 - 17)  SpO2: 99% (13 Jan 2020 05:12) (95% - 99%)    LEFT KNEE :    WOUND DRY AND INTACT  +1 EDEMA, DIFFUSE ECCHYMOSIS MEDIAL AND LATERAL THIGH   GOOD MOTOR TO LEFT LOWER EXTREMITY  NEURO-VASCULAR STATUS INTACT  NO CALF TENDERNESS                        11.7   8.95  )-----------( 322      ( 13 Jan 2020 05:05 )             35.4       Hemoglobin: 11.3 (01-12 @ 06:24)  Hemoglobin: 12.5 (01-10 @ 06:52)    Hematocrit: 33.9 (01-12 @ 06:24)  Hematocrit: 36.4 (01-10 @ 06:52)    01-13    140  |  106  |  24<H>  ----------------------------<  107<H>  4.2   |  29  |  0.96    Ca    8.4<L>      13 Jan 2020 05:05    PT/INR - ( 13 Jan 2020 05:05 )   PT: 12.9 sec;   INR: 1.14 ratio    PTT - ( 13 Jan 2020 05:05 )  PTT:33.2 sec    Type + Screen (01.13.20 @ 05:05)    ABO RH Interpretation: A POS                           ASSESSMENT &  PLAN:  POD # 7 S/P LEFT TOTAL KNEE REPLACEMENT    WEIGHT  BEARING AS TOLERATED, OOB AND AMBULATE, PHYSICAL THERAPY   NEW AQUACEL DRESSING APPLIED    NPO  TO OR TODAY  FOR A RIGHT TKR

## 2020-01-13 NOTE — PHYSICAL THERAPY INITIAL EVALUATION ADULT - PLANNED THERAPY INTERVENTIONS, PT EVAL
bed mobility training/gait training/strengthening/transfer training
transfer training/bed mobility training/gait training

## 2020-01-14 LAB
ANION GAP SERPL CALC-SCNC: 7 MMOL/L — SIGNIFICANT CHANGE UP (ref 5–17)
BUN SERPL-MCNC: 25 MG/DL — HIGH (ref 7–23)
CALCIUM SERPL-MCNC: 8.4 MG/DL — LOW (ref 8.5–10.1)
CHLORIDE SERPL-SCNC: 105 MMOL/L — SIGNIFICANT CHANGE UP (ref 96–108)
CO2 SERPL-SCNC: 27 MMOL/L — SIGNIFICANT CHANGE UP (ref 22–31)
CREAT SERPL-MCNC: 1.1 MG/DL — SIGNIFICANT CHANGE UP (ref 0.5–1.3)
GLUCOSE SERPL-MCNC: 188 MG/DL — HIGH (ref 70–99)
HCT VFR BLD CALC: 30.1 % — LOW (ref 39–50)
HGB BLD-MCNC: 10 G/DL — LOW (ref 13–17)
MCHC RBC-ENTMCNC: 29.7 PG — SIGNIFICANT CHANGE UP (ref 27–34)
MCHC RBC-ENTMCNC: 33.2 GM/DL — SIGNIFICANT CHANGE UP (ref 32–36)
MCV RBC AUTO: 89.3 FL — SIGNIFICANT CHANGE UP (ref 80–100)
NRBC # BLD: 0 /100 WBCS — SIGNIFICANT CHANGE UP (ref 0–0)
PLATELET # BLD AUTO: 324 K/UL — SIGNIFICANT CHANGE UP (ref 150–400)
POTASSIUM SERPL-MCNC: 4.6 MMOL/L — SIGNIFICANT CHANGE UP (ref 3.5–5.3)
POTASSIUM SERPL-SCNC: 4.6 MMOL/L — SIGNIFICANT CHANGE UP (ref 3.5–5.3)
RBC # BLD: 3.37 M/UL — LOW (ref 4.2–5.8)
RBC # FLD: 12.4 % — SIGNIFICANT CHANGE UP (ref 10.3–14.5)
SODIUM SERPL-SCNC: 139 MMOL/L — SIGNIFICANT CHANGE UP (ref 135–145)
WBC # BLD: 14.63 K/UL — HIGH (ref 3.8–10.5)
WBC # FLD AUTO: 14.63 K/UL — HIGH (ref 3.8–10.5)

## 2020-01-14 RX ORDER — SODIUM CHLORIDE 9 MG/ML
1000 INJECTION INTRAMUSCULAR; INTRAVENOUS; SUBCUTANEOUS
Refills: 0 | Status: DISCONTINUED | OUTPATIENT
Start: 2020-01-14 | End: 2020-01-15

## 2020-01-14 RX ORDER — LEVOTHYROXINE SODIUM 125 MCG
1 TABLET ORAL
Qty: 0 | Refills: 0 | DISCHARGE
Start: 2020-01-14

## 2020-01-14 RX ORDER — ATORVASTATIN CALCIUM 80 MG/1
1 TABLET, FILM COATED ORAL
Qty: 0 | Refills: 0 | DISCHARGE
Start: 2020-01-14

## 2020-01-14 RX ORDER — DEXTROAMPHETAMINE SACCHARATE, AMPHETAMINE ASPARTATE, DEXTROAMPHETAMINE SULFATE AND AMPHETAMINE SULFATE 1.875; 1.875; 1.875; 1.875 MG/1; MG/1; MG/1; MG/1
1 TABLET ORAL
Qty: 0 | Refills: 0 | DISCHARGE
Start: 2020-01-14

## 2020-01-14 RX ORDER — LISINOPRIL 2.5 MG/1
0 TABLET ORAL
Qty: 0 | Refills: 0 | DISCHARGE

## 2020-01-14 RX ORDER — ATORVASTATIN CALCIUM 80 MG/1
1 TABLET, FILM COATED ORAL
Qty: 0 | Refills: 0 | DISCHARGE

## 2020-01-14 RX ORDER — TRAMADOL HYDROCHLORIDE 50 MG/1
1 TABLET ORAL
Qty: 20 | Refills: 0
Start: 2020-01-14 | End: 2020-01-18

## 2020-01-14 RX ORDER — LEVOTHYROXINE SODIUM 125 MCG
1 TABLET ORAL
Qty: 0 | Refills: 0 | DISCHARGE

## 2020-01-14 RX ORDER — LISINOPRIL 2.5 MG/1
1 TABLET ORAL
Qty: 0 | Refills: 0 | DISCHARGE
Start: 2020-01-14

## 2020-01-14 RX ORDER — DEXTROAMPHETAMINE SACCHARATE, AMPHETAMINE ASPARTATE, DEXTROAMPHETAMINE SULFATE AND AMPHETAMINE SULFATE 1.875; 1.875; 1.875; 1.875 MG/1; MG/1; MG/1; MG/1
1 TABLET ORAL
Qty: 0 | Refills: 0 | DISCHARGE

## 2020-01-14 RX ORDER — DOCUSATE SODIUM 100 MG
1 CAPSULE ORAL
Qty: 60 | Refills: 0
Start: 2020-01-14 | End: 2020-02-12

## 2020-01-14 RX ORDER — FERROUS SULFATE 325(65) MG
1 TABLET ORAL
Qty: 60 | Refills: 0
Start: 2020-01-14 | End: 2020-02-12

## 2020-01-14 RX ORDER — RIVAROXABAN 15 MG-20MG
1 KIT ORAL
Qty: 32 | Refills: 0
Start: 2020-01-14 | End: 2020-02-14

## 2020-01-14 RX ORDER — CELECOXIB 200 MG/1
1 CAPSULE ORAL
Qty: 0 | Refills: 0 | DISCHARGE
Start: 2020-01-14

## 2020-01-14 RX ADMIN — OXYCODONE HYDROCHLORIDE 10 MILLIGRAM(S): 5 TABLET ORAL at 03:11

## 2020-01-14 RX ADMIN — Medication 325 MILLIGRAM(S): at 08:49

## 2020-01-14 RX ADMIN — OXYCODONE HYDROCHLORIDE 10 MILLIGRAM(S): 5 TABLET ORAL at 22:41

## 2020-01-14 RX ADMIN — Medication 1 MILLIGRAM(S): at 12:44

## 2020-01-14 RX ADMIN — PANTOPRAZOLE SODIUM 40 MILLIGRAM(S): 20 TABLET, DELAYED RELEASE ORAL at 06:31

## 2020-01-14 RX ADMIN — Medication 400 MILLIGRAM(S): at 02:09

## 2020-01-14 RX ADMIN — Medication 500 MILLIGRAM(S): at 18:05

## 2020-01-14 RX ADMIN — SODIUM CHLORIDE 100 MILLILITER(S): 9 INJECTION INTRAMUSCULAR; INTRAVENOUS; SUBCUTANEOUS at 08:46

## 2020-01-14 RX ADMIN — OXYCODONE HYDROCHLORIDE 10 MILLIGRAM(S): 5 TABLET ORAL at 23:41

## 2020-01-14 RX ADMIN — Medication 325 MILLIGRAM(S): at 18:05

## 2020-01-14 RX ADMIN — OXYCODONE HYDROCHLORIDE 10 MILLIGRAM(S): 5 TABLET ORAL at 16:26

## 2020-01-14 RX ADMIN — Medication 325 MILLIGRAM(S): at 12:44

## 2020-01-14 RX ADMIN — ATORVASTATIN CALCIUM 40 MILLIGRAM(S): 80 TABLET, FILM COATED ORAL at 22:41

## 2020-01-14 RX ADMIN — CELECOXIB 200 MILLIGRAM(S): 200 CAPSULE ORAL at 07:27

## 2020-01-14 RX ADMIN — CELECOXIB 200 MILLIGRAM(S): 200 CAPSULE ORAL at 18:06

## 2020-01-14 RX ADMIN — CELECOXIB 200 MILLIGRAM(S): 200 CAPSULE ORAL at 06:31

## 2020-01-14 RX ADMIN — CELECOXIB 200 MILLIGRAM(S): 200 CAPSULE ORAL at 18:08

## 2020-01-14 RX ADMIN — RIVAROXABAN 10 MILLIGRAM(S): KIT at 12:44

## 2020-01-14 RX ADMIN — Medication 175 MICROGRAM(S): at 06:31

## 2020-01-14 RX ADMIN — OXYCODONE HYDROCHLORIDE 10 MILLIGRAM(S): 5 TABLET ORAL at 17:26

## 2020-01-14 RX ADMIN — SODIUM CHLORIDE 100 MILLILITER(S): 9 INJECTION INTRAMUSCULAR; INTRAVENOUS; SUBCUTANEOUS at 22:41

## 2020-01-14 RX ADMIN — Medication 500 MILLIGRAM(S): at 06:31

## 2020-01-14 RX ADMIN — Medication 1000 MILLIGRAM(S): at 02:24

## 2020-01-14 NOTE — DIETITIAN INITIAL EVALUATION ADULT. - WEIGHT IN KG
My Depression Action Plan  Name: Leighann Botello   Date of Birth 2007  Date: 11/22/2019    My doctor: Girma Kirkland   My clinic: [place] [address] [facility] [address] [phone]          GREEN    ZONE   Good Control    What it looks like:     Things are going generally well. You have normal up s and down s. You may even feel depressed from time to time, but bad moods usually last less than a day.   What you need to do:  1. Continue to care for yourself (see self care plan)  2. Check your depression survival kit and update it as needed  3. Follow your physician s recommendations including any medication.  4. Do not stop taking medication unless you consult with your physician first.           YELLOW         ZONE Getting Worse    What it looks like:     Depression is starting to interfere with your life.     It may be hard to get out of bed; you may be starting to isolate yourself from others.    Symptoms of depression are starting to last most all day and this has happened for several days.     You may have suicidal thoughts but they are not constant.   What you need to do:     1. Call your care team, your response to treatment will improve if you keep your care team informed of your progress. Yellow periods are signs an adjustment may need to be made.     2. Continue your self-care, even if you have to fake it!    3. Talk to someone in your support network    4. Open up your depression survival kit           RED    ZONE Medical Alert - Get Help    What it looks like:     Depression is seriously interfering with your life.     You may experience these or other symptoms: You can t get out of bed most days, can t work or engage in other necessary activities, you have trouble taking care of basic hygiene, or basic responsibilities, thoughts of suicide or death that will not go away, self-injurious behavior.     What you need to do:  1. Call your care team and  request a same-day appointment. If they are not available (weekends or after hours) call your local crisis line, emergency room or 911.            Depression Self Care Plan / Survival Kit    Self-Care for Depression  Here s the deal. Your body and mind are really not as separate as most people think.  What you do and think affects how you feel and how you feel influences what you do and think. This means if you do things that people who feel good do, it will help you feel better.  Sometimes this is all it takes.  There is also a place for medication and therapy depending on how severe your depression is, so be sure to consult with your medical provider and/ or Behavioral Health Consultant if your symptoms are worsening or not improving.     In order to better manage my stress, I will:    Exercise  Get some form of exercise, every day. This will help reduce pain and release endorphins, the  feel good  chemicals in your brain. This is almost as good as taking antidepressants!  This is not the same as joining a gym and then never going! (they count on that by the way ) It can be as simple as just going for a walk or doing some gardening, anything that will get you moving.      Hygiene   Maintain good hygiene (Get out of bed in the morning, Make your bed, Brush your teeth, Take a shower, and Get dressed like you were going to work, even if you are unemployed).  If your clothes don't fit try to get ones that do.    Diet  I will strive to eat foods that are good for me, drink plenty of water, and avoid excessive sugar, caffeine, alcohol, and other mood-altering substances.  Some foods that are helpful in depression are: complex carbohydrates, B vitamins, flaxseed, fish or fish oil, fresh fruits and vegetables.    Psychotherapy  I agree to participate in Individual Therapy (if recommended).    Medication  If prescribed medications, I agree to take them.  Missing doses can result in serious side effects.  I understand that  drinking alcohol, or other illicit drug use, may cause potential side effects.  I will not stop my medication abruptly without first discussing it with my provider.    Staying Connected With Others  I will stay in touch with my friends, family members, and my primary care provider/team.    Use your imagination  Be creative.  We all have a creative side; it doesn t matter if it s oil painting, sand castles, or mud pies! This will also kick up the endorphins.    Witness Beauty  (AKA stop and smell the roses) Take a look outside, even in mid-winter. Notice colors, textures. Watch the squirrels and birds.     Service to others  Be of service to others.  There is always someone else in need.  By helping others we can  get out of ourselves  and remember the really important things.  This also provides opportunities for practicing all the other parts of the program.    Humor  Laugh and be silly!  Adjust your TV habits for less news and crime-drama and more comedy.    Control your stress  Try breathing deep, massage therapy, biofeedback, and meditation. Find time to relax each day.     My support system    Clinic Contact:  Phone number:    Contact 1:  Phone number:    Contact 2:  Phone number:    Jew/:  Phone number:    Therapist:  Phone number:    Local crisis center:    Phone number:    Other community support:  Phone number:       [phone]

## 2020-01-14 NOTE — PROGRESS NOTE ADULT - SUBJECTIVE AND OBJECTIVE BOX
DEPARTMENT OF ANESTHESIA  POST ANESTHETIC EVALUATION    The Patient was interviewed and evaluated    Vital Signs Last 24 Hrs  T(C): 36.7 (14 Jan 2020 07:39), Max: 36.9 (14 Jan 2020 04:26)  T(F): 98 (14 Jan 2020 07:39), Max: 98.4 (14 Jan 2020 04:26)  HR: 86 (14 Jan 2020 07:39) (72 - 101)  BP: 131/69 (14 Jan 2020 07:39) (77/46 - 131/69)  BP(mean): --  RR: 19 (14 Jan 2020 07:39) (13 - 19)  SpO2: 96% (14 Jan 2020 07:39) (93% - 99%)    Evaluation:      (x ) No apparent complications or complaints regarding anesthesia care at this time  (x ) All questions were answered    Condition:  (x ) Stable      ( ) Guarded      ( ) Critical    Recommendations:  (x ) None     ( ) Other:

## 2020-01-14 NOTE — DIETITIAN INITIAL EVALUATION ADULT. - OTHER INFO
patient seen LOS with good PO intake. NKFA , reports some weight gain PTA with less activity and stress before surgery . status post bilateral knee replacements. history HTN High cholesterol and hypothyroid. reports eats dinner late after activities with children and work discussed healthy snacks and portion control . drinks soda but now will give up. encouraged healthy food choices.

## 2020-01-14 NOTE — PROGRESS NOTE ADULT - SUBJECTIVE AND OBJECTIVE BOX
VICTOR MANUEL RECINOS      49y   male  MRN-[id number]    ORTHOPEDIC SURGERY / DR. PUENTE    POD # 1 S/P LEFT TKR   POD # 8 S/P RIGHT TKR     Vital Signs Last 24 Hrs  T(C): 36.9 (14 Jan 2020 04:26), Max: 36.9 (14 Jan 2020 04:26)  T(F): 98.4 (14 Jan 2020 04:26), Max: 98.4 (14 Jan 2020 04:26)  HR: 83 (14 Jan 2020 04:26) (72 - 101)  BP: 109/70 (14 Jan 2020 04:26) (77/46 - 121/74)  BP(mean): --  RR: 17 (14 Jan 2020 04:26) (13 - 17)  SpO2: 99% (14 Jan 2020 04:26) (93% - 99%)    BILATERAL KNEE :    DRESSING DRY AND INTACT  RIGHT THIGH UNCHANGED EDEMA AND ECCHYMOSIS   UNCHANGED RIGHT KNEE EDEMA   SOME EDEMA TO LEFT KNEE    GOOD MOTOR TO BILATERAL LOWER EXTREMITY  NEURO-VASCULAR STATUS INTACT  NO CALF TENDERNESS    Hemoglobin: 11.2 (01-13 @ 10:32)  POST OP  Hemoglobin: 11.7 (01-13 @ 05:05)  Hemoglobin: 11.3 (01-12 @ 06:24)    Hematocrit: 33.1 (01-13 @ 10:32) POST OP  Hematocrit: 35.4 (01-13 @ 05:05)  Hematocrit: 33.9 (01-12 @ 06:24)                            ASSESSMENT &  PLAN:  S/P STAGED BILATERAL TOTAL KNEE REPLACEMENT    WEIGHT  BEARING AS TOLERATED, OOB AND AMBULATE, PHYSICAL THERAPY   DVT PROPHYLAXIS XARELTO 10 MG DAILY   F/U AM LABS   DISCHARGE PLANNING TO  REHAB

## 2020-01-14 NOTE — PROGRESS NOTE ADULT - SUBJECTIVE AND OBJECTIVE BOX
Patient is a 49y old  Male who presents with a chief complaint of tkr (12 Jan 2020 09:19)  s/p staged bl tkr  feels well presently, without complaints      INTERVAL HPI/OVERNIGHT EVENTS:  T(C): 36.7 (01-14-20 @ 07:39), Max: 36.9 (01-14-20 @ 04:26)  HR: 102 (01-14-20 @ 08:47) (83 - 102)  BP: 118/72 (01-14-20 @ 08:47) (102/66 - 131/69)  RR: 19 (01-14-20 @ 07:39) (17 - 19)  SpO2: 96% (01-14-20 @ 08:47) (94% - 99%)  Wt(kg): --  I&O's Summary    13 Jan 2020 07:01  -  14 Jan 2020 07:00  --------------------------------------------------------  IN: 580 mL / OUT: 1100 mL / NET: -520 mL    14 Jan 2020 07:01  -  14 Jan 2020 12:46  --------------------------------------------------------  IN: 1000 mL / OUT: 0 mL / NET: 1000 mL        LABS:                        10.0   14.63 )-----------( 324      ( 14 Jan 2020 06:03 )             30.1     01-14    139  |  105  |  25<H>  ----------------------------<  188<H>  4.6   |  27  |  1.10    Ca    8.4<L>      14 Jan 2020 06:03      PT/INR - ( 13 Jan 2020 05:05 )   PT: 12.9 sec;   INR: 1.14 ratio         PTT - ( 13 Jan 2020 05:05 )  PTT:33.2 sec    CAPILLARY BLOOD GLUCOSE                MEDICATIONS  (STANDING):  amphetamine/dextroamphetamine 20 milliGRAM(s) Oral daily  ascorbic acid 500 milliGRAM(s) Oral two times a day  atorvastatin 40 milliGRAM(s) Oral at bedtime  celecoxib 200 milliGRAM(s) Oral two times a day  ferrous    sulfate 325 milliGRAM(s) Oral three times a day with meals  folic acid 1 milliGRAM(s) Oral daily  levothyroxine 175 MICROGram(s) Oral daily  lisinopril 20 milliGRAM(s) Oral daily  pantoprazole    Tablet 40 milliGRAM(s) Oral before breakfast  rivaroxaban 10 milliGRAM(s) Oral daily  sodium chloride 0.9%. 1000 milliLiter(s) (100 mL/Hr) IV Continuous <Continuous>    MEDICATIONS  (PRN):  acetaminophen   Tablet .. 1000 milliGRAM(s) Oral every 6 hours PRN Mild Pain (1 - 3)  benzocaine 15 mG/menthol 3.6 mG (Sugar-Free) Lozenge 1 Lozenge Oral every 4 hours PRN Sore Throat  bisacodyl 5 milliGRAM(s) Oral every 12 hours PRN Constipation  HYDROmorphone   Tablet 2 milliGRAM(s) Oral every 4 hours PRN BREAKTHROUGH Severe Pain 10  (7 - 10)  ondansetron Injectable 4 milliGRAM(s) IV Push every 6 hours PRN Nausea and/or Vomiting  oxyCODONE    IR 10 milliGRAM(s) Oral every 6 hours PRN Severe Pain (7 - 10)  oxyCODONE    IR 5 milliGRAM(s) Oral every 4 hours PRN Moderate Pain (4 - 6)  traMADol 50 milliGRAM(s) Oral every 6 hours PRN Mild Pain (1 - 3)      REVIEW OF SYSTEMS:  CONSTITUTIONAL: No fever, weight loss, or fatigue  EYES: No eye pain, visual disturbances, or discharge  ENMT:  No difficulty hearing, tinnitus, vertigo; No sinus or throat pain  NECK: No pain or stiffness  RESPIRATORY: No cough, wheezing, chills or hemoptysis; No shortness of breath  CARDIOVASCULAR: No chest pain, palpitations, dizziness, or leg swelling  GASTROINTESTINAL: No abdominal or epigastric pain. No nausea, vomiting, or hematemesis; No diarrhea or constipation. No melena or hematochezia.  GENITOURINARY: No dysuria, frequency, hematuria, or incontinence  NEUROLOGICAL: No headaches, memory loss, loss of strength, numbness, or tremors  SKIN: No itching, burning, rashes, or lesions   LYMPH NODES: No enlarged glands  ENDOCRINE: No heat or cold intolerance; No hair loss  MUSCULOSKELETAL: chronic bilateral knee pain  PSYCHIATRIC: No depression, anxiety, mood swings, or difficulty sleeping  HEME/LYMPH: No easy bruising, or bleeding gums  ALLERY AND IMMUNOLOGIC: No hives or eczema    RADIOLOGY & ADDITIONAL TESTS:    Imaging Personally Reviewed:  [ ] YES  [ ] NO    Consultant(s) Notes Reviewed:  [ ] YES  [ ] NO    PHYSICAL EXAM:  GENERAL: NAD, well-groomed, well-developed  HEAD:  Atraumatic, Normocephalic  EYES: EOMI, PERRLA, conjunctiva and sclera clear  ENMT: No tonsillar erythema, exudates, or enlargement; Moist mucous membranes, Good dentition, No lesions  NECK: Supple, No JVD, Normal thyroid  NERVOUS SYSTEM:  Alert & Oriented X3, Good concentration; Motor Strength 5/5 B/L upper and lower extremities; DTRs 2+ intact and symmetric  CHEST/LUNG: Clear to percussion bilaterally; No rales, rhonchi, wheezing, or rubs  HEART: Regular rate and rhythm; No murmurs, rubs, or gallops  ABDOMEN: Soft, Nontender, Nondistended; Bowel sounds present  EXTREMITIES:  2+ Peripheral Pulses, No clubbing, cyanosis, or edema  LYMPH: No lymphadenopathy noted  SKIN: No rashes or lesions    Care Discussed with Consultants/Other Providers [ ] YES  [ ] NO

## 2020-01-15 DIAGNOSIS — K12.0 RECURRENT ORAL APHTHAE: ICD-10-CM

## 2020-01-15 LAB — SURGICAL PATHOLOGY STUDY: SIGNIFICANT CHANGE UP

## 2020-01-15 RX ORDER — LIDOCAINE 4 G/100G
10 CREAM TOPICAL ONCE
Refills: 0 | Status: COMPLETED | OUTPATIENT
Start: 2020-01-15 | End: 2020-01-15

## 2020-01-15 RX ORDER — DIPHENHYDRAMINE HYDROCHLORIDE AND LIDOCAINE HYDROCHLORIDE AND ALUMINUM HYDROXIDE AND MAGNESIUM HYDRO
5 KIT EVERY 6 HOURS
Refills: 0 | Status: DISCONTINUED | OUTPATIENT
Start: 2020-01-15 | End: 2020-01-16

## 2020-01-15 RX ADMIN — CELECOXIB 200 MILLIGRAM(S): 200 CAPSULE ORAL at 17:51

## 2020-01-15 RX ADMIN — Medication 325 MILLIGRAM(S): at 11:42

## 2020-01-15 RX ADMIN — OXYCODONE HYDROCHLORIDE 10 MILLIGRAM(S): 5 TABLET ORAL at 23:53

## 2020-01-15 RX ADMIN — PANTOPRAZOLE SODIUM 40 MILLIGRAM(S): 20 TABLET, DELAYED RELEASE ORAL at 05:19

## 2020-01-15 RX ADMIN — OXYCODONE HYDROCHLORIDE 10 MILLIGRAM(S): 5 TABLET ORAL at 12:42

## 2020-01-15 RX ADMIN — OXYCODONE HYDROCHLORIDE 10 MILLIGRAM(S): 5 TABLET ORAL at 05:19

## 2020-01-15 RX ADMIN — RIVAROXABAN 10 MILLIGRAM(S): KIT at 11:43

## 2020-01-15 RX ADMIN — ATORVASTATIN CALCIUM 40 MILLIGRAM(S): 80 TABLET, FILM COATED ORAL at 21:50

## 2020-01-15 RX ADMIN — LIDOCAINE 10 MILLILITER(S): 4 CREAM TOPICAL at 21:51

## 2020-01-15 RX ADMIN — Medication 175 MICROGRAM(S): at 05:18

## 2020-01-15 RX ADMIN — OXYCODONE HYDROCHLORIDE 10 MILLIGRAM(S): 5 TABLET ORAL at 18:05

## 2020-01-15 RX ADMIN — Medication 500 MILLIGRAM(S): at 05:18

## 2020-01-15 RX ADMIN — OXYCODONE HYDROCHLORIDE 10 MILLIGRAM(S): 5 TABLET ORAL at 19:05

## 2020-01-15 RX ADMIN — OXYCODONE HYDROCHLORIDE 10 MILLIGRAM(S): 5 TABLET ORAL at 06:17

## 2020-01-15 RX ADMIN — Medication 1 MILLIGRAM(S): at 11:42

## 2020-01-15 RX ADMIN — OXYCODONE HYDROCHLORIDE 10 MILLIGRAM(S): 5 TABLET ORAL at 11:42

## 2020-01-15 RX ADMIN — CELECOXIB 200 MILLIGRAM(S): 200 CAPSULE ORAL at 05:17

## 2020-01-15 RX ADMIN — HYDROMORPHONE HYDROCHLORIDE 2 MILLIGRAM(S): 2 INJECTION INTRAMUSCULAR; INTRAVENOUS; SUBCUTANEOUS at 10:52

## 2020-01-15 RX ADMIN — Medication 500 MILLIGRAM(S): at 17:21

## 2020-01-15 RX ADMIN — BENZOCAINE AND MENTHOL 1 LOZENGE: 5; 1 LIQUID ORAL at 17:21

## 2020-01-15 RX ADMIN — HYDROMORPHONE HYDROCHLORIDE 2 MILLIGRAM(S): 2 INJECTION INTRAMUSCULAR; INTRAVENOUS; SUBCUTANEOUS at 09:52

## 2020-01-15 RX ADMIN — CELECOXIB 200 MILLIGRAM(S): 200 CAPSULE ORAL at 06:17

## 2020-01-15 RX ADMIN — Medication 325 MILLIGRAM(S): at 17:21

## 2020-01-15 RX ADMIN — Medication 325 MILLIGRAM(S): at 08:24

## 2020-01-15 RX ADMIN — CELECOXIB 200 MILLIGRAM(S): 200 CAPSULE ORAL at 17:21

## 2020-01-15 NOTE — PROGRESS NOTE ADULT - SUBJECTIVE AND OBJECTIVE BOX
Patient is seen and examined at bedside. Denies CP/SOB/Dizziness/N/V/D/HA. Pain is controlled.     Vital Signs Last 24 Hrs  T(C): 36.6 (15 Justin 2020 07:36), Max: 36.9 (14 Jan 2020 16:17)  T(F): 97.8 (15 Justin 2020 07:36), Max: 98.5 (14 Jan 2020 16:17)  HR: 102 (15 Justin 2020 07:36) (86 - 103)  BP: 132/79 (15 Justin 2020 07:36) (104/68 - 132/79)  BP(mean): --  RR: 19 (15 Justin 2020 07:36) (17 - 19)  SpO2: 97% (15 Justin 2020 07:36) (97% - 100%)    GEN: NAD  BL LE: Dressing C/D/I.    Motor intact + EHL/FHL/TA/GS in the BL LE. Sensation is grossly intact distal . Extremity warm. Compartments are soft. DP 1+    Labs:                          10.0   14.63 )-----------( 324      ( 14 Jan 2020 06:03 )             30.1       01-14    139  |  105  |  25<H>  ----------------------------<  188<H>  4.6   |  27  |  1.10    Ca    8.4<L>      14 Jan 2020 06:03        A/P: Patient is a 50y y/o Male s/p R TKA, POD #2, L TKA POD #9     -Pain control/analgesia  -Inc spirometry  -Venodynes/foot pumps  -F/U AM Labs  -PT/OT/WBAT  -Antibiotic  -Anticoagulation  -Dc home tomorrow

## 2020-01-15 NOTE — PROGRESS NOTE ADULT - SUBJECTIVE AND OBJECTIVE BOX
Patient is a 50y old  Male who presents with a chief complaint of tkr (12 Jan 2020 09:19)  s/p staged bilateral tkr  less pain  complains of oropharyngeal pain      INTERVAL HPI/OVERNIGHT EVENTS:  T(C): 36.7 (01-15-20 @ 15:48), Max: 36.8 (01-15-20 @ 00:08)  HR: 92 (01-15-20 @ 15:48) (92 - 103)  BP: 115/70 (01-15-20 @ 15:48) (108/61 - 132/79)  RR: 20 (01-15-20 @ 15:48) (17 - 20)  SpO2: 100% (01-15-20 @ 15:48) (97% - 100%)  Wt(kg): --  I&O's Summary    14 Jan 2020 07:01  -  15 Justin 2020 07:00  --------------------------------------------------------  IN: 1450 mL / OUT: 3500 mL / NET: -2050 mL    15 Justin 2020 07:01  -  15 Justin 2020 21:28  --------------------------------------------------------  IN: 0 mL / OUT: 800 mL / NET: -800 mL        LABS:                        10.0   14.63 )-----------( 324      ( 14 Jan 2020 06:03 )             30.1     01-14    139  |  105  |  25<H>  ----------------------------<  188<H>  4.6   |  27  |  1.10    Ca    8.4<L>      14 Jan 2020 06:03          CAPILLARY BLOOD GLUCOSE                MEDICATIONS  (STANDING):  amphetamine/dextroamphetamine 20 milliGRAM(s) Oral daily  ascorbic acid 500 milliGRAM(s) Oral two times a day  atorvastatin 40 milliGRAM(s) Oral at bedtime  celecoxib 200 milliGRAM(s) Oral two times a day  ferrous    sulfate 325 milliGRAM(s) Oral three times a day with meals  FIRST- Mouthwash  BLM 5 milliLiter(s) Swish and Spit every 6 hours  folic acid 1 milliGRAM(s) Oral daily  levothyroxine 175 MICROGram(s) Oral daily  lidocaine 2% Viscous 10 milliLiter(s) Swish and Spit once  lisinopril 20 milliGRAM(s) Oral daily  pantoprazole    Tablet 40 milliGRAM(s) Oral before breakfast  rivaroxaban 10 milliGRAM(s) Oral daily    MEDICATIONS  (PRN):  acetaminophen   Tablet .. 1000 milliGRAM(s) Oral every 6 hours PRN Mild Pain (1 - 3)  benzocaine 15 mG/menthol 3.6 mG (Sugar-Free) Lozenge 1 Lozenge Oral every 4 hours PRN Sore Throat  bisacodyl 5 milliGRAM(s) Oral every 12 hours PRN Constipation  HYDROmorphone   Tablet 2 milliGRAM(s) Oral every 4 hours PRN BREAKTHROUGH Severe Pain 10  (7 - 10)  ondansetron Injectable 4 milliGRAM(s) IV Push every 6 hours PRN Nausea and/or Vomiting  oxyCODONE    IR 10 milliGRAM(s) Oral every 6 hours PRN Severe Pain (7 - 10)  oxyCODONE    IR 5 milliGRAM(s) Oral every 4 hours PRN Moderate Pain (4 - 6)  traMADol 50 milliGRAM(s) Oral every 6 hours PRN Mild Pain (1 - 3)      REVIEW OF SYSTEMS:  CONSTITUTIONAL: No fever, weight loss, or fatigue  EYES: No eye pain, visual disturbances, or discharge  ENMT:  No difficulty hearing, tinnitus, vertigo; No sinus or throat pain  NECK: No pain or stiffness  RESPIRATORY: No cough, wheezing, chills or hemoptysis; No shortness of breath  CARDIOVASCULAR: No chest pain, palpitations, dizziness, or leg swelling  GASTROINTESTINAL: No abdominal or epigastric pain. No nausea, vomiting, or hematemesis; No diarrhea or constipation. No melena or hematochezia.  GENITOURINARY: No dysuria, frequency, hematuria, or incontinence  NEUROLOGICAL: No headaches, memory loss, loss of strength, numbness, or tremors  SKIN: No itching, burning, rashes, or lesions   LYMPH NODES: No enlarged glands  ENDOCRINE: No heat or cold intolerance; No hair loss  MUSCULOSKELETAL: No joint pain or swelling; No muscle, back, or extremity pain  PSYCHIATRIC: No depression, anxiety, mood swings, or difficulty sleeping  HEME/LYMPH: No easy bruising, or bleeding gums  ALLERY AND IMMUNOLOGIC: No hives or eczema    RADIOLOGY & ADDITIONAL TESTS:    Imaging Personally Reviewed:  [ ] YES  [ ] NO    Consultant(s) Notes Reviewed:  [ ] YES  [ ] NO    PHYSICAL EXAM:  GENERAL: NAD, well-groomed, well-developed  HEAD:  Atraumatic, Normocephalic  EYES: EOMI, PERRLA, conjunctiva and sclera clear  ENMT: 3 apthous ulcers mouth and upper buccal mucosa  NECK: Supple, No JVD, Normal thyroid  NERVOUS SYSTEM:  Alert & Oriented X3, Good concentration; Motor Strength 5/5 B/L upper and lower extremities; DTRs 2+ intact and symmetric  CHEST/LUNG: Clear to percussion bilaterally; No rales, rhonchi, wheezing, or rubs  HEART: Regular rate and rhythm; No murmurs, rubs, or gallops  ABDOMEN: Soft, Nontender, Nondistended; Bowel sounds present  EXTREMITIES:  2+ Peripheral Pulses, No clubbing, cyanosis, or edema  LYMPH: No lymphadenopathy noted  SKIN: No rashes or lesions    Care Discussed with Consultants/Other Providers [ ] YES  [ ] NO

## 2020-01-15 NOTE — PROVIDER CONTACT NOTE (OTHER) - REASON
Pt IV was leaking and attempted to place new IV but patient unable to tolerate, Pt refused to have new IV placed

## 2020-01-16 ENCOUNTER — TRANSCRIPTION ENCOUNTER (OUTPATIENT)
Age: 50
End: 2020-01-16

## 2020-01-16 VITALS
DIASTOLIC BLOOD PRESSURE: 73 MMHG | RESPIRATION RATE: 19 BRPM | HEART RATE: 104 BPM | OXYGEN SATURATION: 94 % | TEMPERATURE: 98 F | SYSTOLIC BLOOD PRESSURE: 111 MMHG

## 2020-01-16 LAB
ANION GAP SERPL CALC-SCNC: 8 MMOL/L — SIGNIFICANT CHANGE UP (ref 5–17)
BUN SERPL-MCNC: 19 MG/DL — SIGNIFICANT CHANGE UP (ref 7–23)
CALCIUM SERPL-MCNC: 8.9 MG/DL — SIGNIFICANT CHANGE UP (ref 8.5–10.1)
CHLORIDE SERPL-SCNC: 104 MMOL/L — SIGNIFICANT CHANGE UP (ref 96–108)
CO2 SERPL-SCNC: 28 MMOL/L — SIGNIFICANT CHANGE UP (ref 22–31)
CREAT SERPL-MCNC: 0.98 MG/DL — SIGNIFICANT CHANGE UP (ref 0.5–1.3)
GLUCOSE SERPL-MCNC: 133 MG/DL — HIGH (ref 70–99)
HCT VFR BLD CALC: 31.4 % — LOW (ref 39–50)
HGB BLD-MCNC: 10.2 G/DL — LOW (ref 13–17)
POTASSIUM SERPL-MCNC: 3.9 MMOL/L — SIGNIFICANT CHANGE UP (ref 3.5–5.3)
POTASSIUM SERPL-SCNC: 3.9 MMOL/L — SIGNIFICANT CHANGE UP (ref 3.5–5.3)
SODIUM SERPL-SCNC: 140 MMOL/L — SIGNIFICANT CHANGE UP (ref 135–145)

## 2020-01-16 PROCEDURE — 85014 HEMATOCRIT: CPT

## 2020-01-16 PROCEDURE — 97162 PT EVAL MOD COMPLEX 30 MIN: CPT

## 2020-01-16 PROCEDURE — 36415 COLL VENOUS BLD VENIPUNCTURE: CPT

## 2020-01-16 PROCEDURE — 97116 GAIT TRAINING THERAPY: CPT

## 2020-01-16 PROCEDURE — 88305 TISSUE EXAM BY PATHOLOGIST: CPT

## 2020-01-16 PROCEDURE — 85027 COMPLETE CBC AUTOMATED: CPT

## 2020-01-16 PROCEDURE — C1713: CPT

## 2020-01-16 PROCEDURE — 97530 THERAPEUTIC ACTIVITIES: CPT

## 2020-01-16 PROCEDURE — 86901 BLOOD TYPING SEROLOGIC RH(D): CPT

## 2020-01-16 PROCEDURE — 85610 PROTHROMBIN TIME: CPT

## 2020-01-16 PROCEDURE — 86900 BLOOD TYPING SEROLOGIC ABO: CPT

## 2020-01-16 PROCEDURE — 97535 SELF CARE MNGMENT TRAINING: CPT

## 2020-01-16 PROCEDURE — 97165 OT EVAL LOW COMPLEX 30 MIN: CPT

## 2020-01-16 PROCEDURE — 86850 RBC ANTIBODY SCREEN: CPT

## 2020-01-16 PROCEDURE — 93970 EXTREMITY STUDY: CPT

## 2020-01-16 PROCEDURE — 97110 THERAPEUTIC EXERCISES: CPT

## 2020-01-16 PROCEDURE — 85730 THROMBOPLASTIN TIME PARTIAL: CPT

## 2020-01-16 PROCEDURE — 97168 OT RE-EVAL EST PLAN CARE: CPT

## 2020-01-16 PROCEDURE — 80048 BASIC METABOLIC PNL TOTAL CA: CPT

## 2020-01-16 PROCEDURE — 82962 GLUCOSE BLOOD TEST: CPT

## 2020-01-16 PROCEDURE — 85018 HEMOGLOBIN: CPT

## 2020-01-16 PROCEDURE — 88311 DECALCIFY TISSUE: CPT

## 2020-01-16 PROCEDURE — 73562 X-RAY EXAM OF KNEE 3: CPT

## 2020-01-16 PROCEDURE — C1776: CPT

## 2020-01-16 RX ORDER — OXYCODONE HYDROCHLORIDE 5 MG/1
1 TABLET ORAL
Qty: 60 | Refills: 0
Start: 2020-01-16 | End: 2020-01-25

## 2020-01-16 RX ADMIN — OXYCODONE HYDROCHLORIDE 10 MILLIGRAM(S): 5 TABLET ORAL at 18:46

## 2020-01-16 RX ADMIN — DIPHENHYDRAMINE HYDROCHLORIDE AND LIDOCAINE HYDROCHLORIDE AND ALUMINUM HYDROXIDE AND MAGNESIUM HYDRO 5 MILLILITER(S): KIT at 12:16

## 2020-01-16 RX ADMIN — OXYCODONE HYDROCHLORIDE 10 MILLIGRAM(S): 5 TABLET ORAL at 07:30

## 2020-01-16 RX ADMIN — Medication 500 MILLIGRAM(S): at 06:26

## 2020-01-16 RX ADMIN — CELECOXIB 200 MILLIGRAM(S): 200 CAPSULE ORAL at 06:26

## 2020-01-16 RX ADMIN — OXYCODONE HYDROCHLORIDE 10 MILLIGRAM(S): 5 TABLET ORAL at 12:15

## 2020-01-16 RX ADMIN — Medication 500 MILLIGRAM(S): at 18:05

## 2020-01-16 RX ADMIN — PANTOPRAZOLE SODIUM 40 MILLIGRAM(S): 20 TABLET, DELAYED RELEASE ORAL at 06:26

## 2020-01-16 RX ADMIN — DIPHENHYDRAMINE HYDROCHLORIDE AND LIDOCAINE HYDROCHLORIDE AND ALUMINUM HYDROXIDE AND MAGNESIUM HYDRO 5 MILLILITER(S): KIT at 18:05

## 2020-01-16 RX ADMIN — OXYCODONE HYDROCHLORIDE 10 MILLIGRAM(S): 5 TABLET ORAL at 06:25

## 2020-01-16 RX ADMIN — LISINOPRIL 20 MILLIGRAM(S): 2.5 TABLET ORAL at 06:26

## 2020-01-16 RX ADMIN — BENZOCAINE AND MENTHOL 1 LOZENGE: 5; 1 LIQUID ORAL at 18:05

## 2020-01-16 RX ADMIN — Medication 325 MILLIGRAM(S): at 18:05

## 2020-01-16 RX ADMIN — OXYCODONE HYDROCHLORIDE 10 MILLIGRAM(S): 5 TABLET ORAL at 01:00

## 2020-01-16 RX ADMIN — OXYCODONE HYDROCHLORIDE 10 MILLIGRAM(S): 5 TABLET ORAL at 13:15

## 2020-01-16 RX ADMIN — RIVAROXABAN 10 MILLIGRAM(S): KIT at 12:15

## 2020-01-16 RX ADMIN — OXYCODONE HYDROCHLORIDE 10 MILLIGRAM(S): 5 TABLET ORAL at 18:05

## 2020-01-16 RX ADMIN — CELECOXIB 200 MILLIGRAM(S): 200 CAPSULE ORAL at 18:06

## 2020-01-16 RX ADMIN — DIPHENHYDRAMINE HYDROCHLORIDE AND LIDOCAINE HYDROCHLORIDE AND ALUMINUM HYDROXIDE AND MAGNESIUM HYDRO 5 MILLILITER(S): KIT at 06:26

## 2020-01-16 RX ADMIN — Medication 325 MILLIGRAM(S): at 08:21

## 2020-01-16 RX ADMIN — Medication 1 MILLIGRAM(S): at 12:15

## 2020-01-16 RX ADMIN — Medication 325 MILLIGRAM(S): at 12:15

## 2020-01-16 RX ADMIN — CELECOXIB 200 MILLIGRAM(S): 200 CAPSULE ORAL at 18:05

## 2020-01-16 RX ADMIN — Medication 175 MICROGRAM(S): at 06:26

## 2020-01-16 NOTE — PROGRESS NOTE ADULT - SUBJECTIVE AND OBJECTIVE BOX
Patient is a 50y old  Male who presents with a chief complaint of tkr (12 Jan 2020 09:19)  feels well presently      INTERVAL HPI/OVERNIGHT EVENTS:  T(C): 36.7 (01-16-20 @ 07:49), Max: 36.7 (01-15-20 @ 15:48)  HR: 104 (01-16-20 @ 09:00) (92 - 104)  BP: 120/57 (01-16-20 @ 09:00) (115/70 - 144/78)  RR: 20 (01-16-20 @ 07:49) (18 - 20)  SpO2: 98% (01-16-20 @ 09:00) (96% - 100%)  Wt(kg): --  I&O's Summary    15 Justin 2020 07:01  -  16 Jan 2020 07:00  --------------------------------------------------------  IN: 0 mL / OUT: 1000 mL / NET: -1000 mL        LABS:                        10.2   x     )-----------( x        ( 16 Jan 2020 09:24 )             31.4     01-16    140  |  104  |  19  ----------------------------<  133<H>  3.9   |  28  |  0.98    Ca    8.9      16 Jan 2020 09:24          CAPILLARY BLOOD GLUCOSE                MEDICATIONS  (STANDING):  amphetamine/dextroamphetamine 20 milliGRAM(s) Oral daily  ascorbic acid 500 milliGRAM(s) Oral two times a day  atorvastatin 40 milliGRAM(s) Oral at bedtime  celecoxib 200 milliGRAM(s) Oral two times a day  ferrous    sulfate 325 milliGRAM(s) Oral three times a day with meals  FIRST- Mouthwash  BLM 5 milliLiter(s) Swish and Spit every 6 hours  folic acid 1 milliGRAM(s) Oral daily  levothyroxine 175 MICROGram(s) Oral daily  lisinopril 20 milliGRAM(s) Oral daily  pantoprazole    Tablet 40 milliGRAM(s) Oral before breakfast  rivaroxaban 10 milliGRAM(s) Oral daily    MEDICATIONS  (PRN):  acetaminophen   Tablet .. 1000 milliGRAM(s) Oral every 6 hours PRN Mild Pain (1 - 3)  benzocaine 15 mG/menthol 3.6 mG (Sugar-Free) Lozenge 1 Lozenge Oral every 4 hours PRN Sore Throat  bisacodyl 5 milliGRAM(s) Oral every 12 hours PRN Constipation  HYDROmorphone   Tablet 2 milliGRAM(s) Oral every 4 hours PRN BREAKTHROUGH Severe Pain 10  (7 - 10)  ondansetron Injectable 4 milliGRAM(s) IV Push every 6 hours PRN Nausea and/or Vomiting  oxyCODONE    IR 10 milliGRAM(s) Oral every 6 hours PRN Severe Pain (7 - 10)  oxyCODONE    IR 5 milliGRAM(s) Oral every 4 hours PRN Moderate Pain (4 - 6)  traMADol 50 milliGRAM(s) Oral every 6 hours PRN Mild Pain (1 - 3)      REVIEW OF SYSTEMS:  CONSTITUTIONAL: No fever, weight loss, or fatigue  EYES: No eye pain, visual disturbances, or discharge  ENMT:  No difficulty hearing, tinnitus, vertigo; No sinus or throat pain  NECK: No pain or stiffness  RESPIRATORY: No cough, wheezing, chills or hemoptysis; No shortness of breath  CARDIOVASCULAR: No chest pain, palpitations, dizziness, or leg swelling  GASTROINTESTINAL: No abdominal or epigastric pain. No nausea, vomiting, or hematemesis; No diarrhea or constipation. No melena or hematochezia.  GENITOURINARY: No dysuria, frequency, hematuria, or incontinence  NEUROLOGICAL: No headaches, memory loss, loss of strength, numbness, or tremors  SKIN: No itching, burning, rashes, or lesions   LYMPH NODES: No enlarged glands  ENDOCRINE: No heat or cold intolerance; No hair loss  MUSCULOSKELETAL: chronic bl knee pain  PSYCHIATRIC: No depression, anxiety, mood swings, or difficulty sleeping  HEME/LYMPH: No easy bruising, or bleeding gums  ALLERY AND IMMUNOLOGIC: No hives or eczema    RADIOLOGY & ADDITIONAL TESTS:    Imaging Personally Reviewed:  [ ] YES  [ ] NO    Consultant(s) Notes Reviewed:  [ ] YES  [ ] NO    PHYSICAL EXAM:  GENERAL: NAD, well-groomed, well-developed  HEAD:  Atraumatic, Normocephalic  EYES: EOMI, PERRLA, conjunctiva and sclera clear  ENMT: No tonsillar erythema, exudates, or enlargement; Moist mucous membranes, Good dentition, No lesions  NECK: Supple, No JVD, Normal thyroid  NERVOUS SYSTEM:  Alert & Oriented X3, Good concentration; Motor Strength 5/5 B/L upper and lower extremities; DTRs 2+ intact and symmetric  CHEST/LUNG: Clear to percussion bilaterally; No rales, rhonchi, wheezing, or rubs  HEART: Regular rate and rhythm; No murmurs, rubs, or gallops  ABDOMEN: Soft, Nontender, Nondistended; Bowel sounds present  EXTREMITIES:  2+ Peripheral Pulses, No clubbing, cyanosis, or edema  LYMPH: No lymphadenopathy noted  SKIN: No rashes or lesions    Care Discussed with Consultants/Other Providers [ ] YES  [ ] NO

## 2020-01-16 NOTE — PROGRESS NOTE ADULT - ATTENDING COMMENTS
TKR of the left   schedulred  for the tkr of the right on monday.  pt is cleared for the surgery please proceed.
pt comfortable   pain well controlled  ambulating with a walker   BP is stable  pt cleared for the much needed procedure
pt seen and examined  pt's wife at [facility]  pain is well controlled  continue BP meds  pt is acceoptable risk for the tkr of the right lower ext  will follow
medically stable for discharge

## 2020-01-16 NOTE — PROGRESS NOTE ADULT - SUBJECTIVE AND OBJECTIVE BOX
VICTOR MANUEL RECINOS      50y   male  [id number]    ORTHOPEDIC SURGERY / DR. PUENTE    POD # 3 S/P RIGHT TKR  POD # 10 S/P LEFT TKR     Vital Signs Last 24 Hrs  T(C): 36.5 (15 Justin 2020 23:51), Max: 36.7 (15 Justin 2020 15:48)  T(F): 97.7 (15 Justin 2020 23:51), Max: 98.1 (15 Justin 2020 15:48)  HR: 103 (15 Justin 2020 23:51) (92 - 103)  BP: 131/76 (15 Justin 2020 23:51) (115/70 - 132/79)  BP(mean): --  RR: 18 (15 Justin 2020 23:51) (18 - 20)  SpO2: 97% (15 Justin 2020 23:51) (97% - 100%)    BILATERAL KNEES :    WOUND DRY AND INTACT  UNCHANGED + 2 EDEMA WITH ECCHYMOSIS LEFT KNEE, + 1 EDEMA RIGHT KNEE   GOOD MOTOR TO BILATERAL LOWER EXTREMITY  NEURO-VASCULAR STATUS INTACT  NO CALF TENDERNESS    Hemoglobin: 10.0 (01-14 @ 06:03)  Hemoglobin: 11.2 (01-13 @ 10:32)    Hematocrit: 30.1 (01-14 @ 06:03)  Hematocrit: 33.1 (01-13 @ 10:32)    01-14    139  |  105  |  25<H>  ----------------------------<  188<H>  4.6   |  27  |  1.10    Ca    8.4<L>      14 Jan 2020 06:03                           ASSESSMENT &  PLAN:  S/P STAGED BILATERAL  TOTAL KNEE REPLACEMENT    WEIGHT  BEARING AS TOLERATED, OOB AND AMBULATE, PHYSICAL THERAPY   DVT PROPHYLAXIS XARELTO 10 MG DAILY   DISCHARGE PLANNING TO HOME WITH HOME CARE TODAY PENDING LABS   NEW AQUACEL DRESSING APPLIED

## 2020-01-16 NOTE — DISCHARGE NOTE NURSING/CASE MANAGEMENT/SOCIAL WORK - PATIENT PORTAL LINK FT
You can access the FollowMyHealth Patient Portal offered by [facility] by registering at the following website: [email or web address]. By joining [employer]’s FollowMyHealth portal, you will also be able to view your health information using other applications (apps) compatible with our system.

## 2020-06-16 NOTE — PHYSICAL THERAPY INITIAL EVALUATION ADULT - PHYSICAL ASSIST/NONPHYSICAL ASSIST: STAND/SIT, REHAB EVAL
Addended by: JANY YOUSIF on: 6/12/2020 03:07 PM     Modules accepted: Orders    
Addended by: JANY YOUSIF on: 6/16/2020 09:20 AM     Modules accepted: Orders    
verbal cues/1 person assist

## 2020-10-27 NOTE — PHYSICAL THERAPY INITIAL EVALUATION ADULT - BED MOBILITY TRAINING, PT EVAL
Patient notified at time of visit.
3 - 5 sessions: Luis x 2 supine - sit with bed rails
3 - 5 sessions, supine-sit CGA with bed rails

## 2020-11-25 NOTE — OCCUPATIONAL THERAPY INITIAL EVALUATION ADULT - RANGE OF MOTION EXAMINATION, UPPER EXTREMITY
ADMIT
bilateral UE Active ROM was WFL  (within functional limits)/Fine motor skills: WFL's
Fine motor skills: WFL's/bilateral UE Active ROM was WFL  (within functional limits)

## 2021-01-01 NOTE — OCCUPATIONAL THERAPY INITIAL EVALUATION ADULT - REFERRAL TO ANOTHER SERVICE NEEDED, OT EVAL
Problem: Bronchoconstriction  Goal: Improve in air movement and diminished wheezing  Description: Target End Date:  2 to 3 days    1.  Implement inhaled treatments  2.  Evaluate and manage medication effects  Outcome: Progressing     Problem: Bronchopulmonary Hygiene  Goal: Increase mobilization of retained secretions  Description: Target End Date:  2 to 3 days    1.  Perform bronchopulmonary therapy as indicated by assessment  2.  Perform airway suctioning  3.  Perform actions to maintain patient airway  Outcome: Progressing     Problem: Humidified High Flow Nasal Cannula  Goal: Maintain adequate oxygenation dependent on patient condition  Description: Target End Date:  resolve prior to discharge or when underlying condition is resolved/stabilized    1.  Implement humidified high flow oxygen therapy  2.  Titrate high flow oxygen to maintain appropriate SpO2  Outcome: Progressing      physical therapy/social work

## 2021-01-25 NOTE — ASU PATIENT PROFILE, ADULT - MEDICATIONS TO TAKE
Prophylactic measure synthroid with small sip of water on day of surgery, take lisinopril night before surgery Prophylactic measure Prophylactic measure Prophylactic measure Prophylactic measure

## 2021-04-22 NOTE — PRE-OP CHECKLIST - WARM FLUIDS/WARM BLANKETS
Vinnie PGY-3:  61yo M here w/ SOB, in context of recent Surgery concern for PE, also with some LE edema raising possibility of fluid overload, will obtain CTA, bloodwork and anticipate admission for further workup/management yes

## 2021-06-26 NOTE — ASU PREOP CHECKLIST - CHLOROHEXIDINE WASH IN ASU
Vistaril every 8 hours as needed for anxiety or panic attacks. Be conscious that they are panic attacks and nothing life-threatening and attempt to relax and slow your breathing down should it occur again. Follow-up with the doctor provided this coming week for follow-up and recheck and continued outpatient management.
06-Jan-2020 06:39

## 2022-06-22 NOTE — BRIEF OPERATIVE NOTE - ELECTIVE PROCEDURE
[facility] Care Coordination Contact    Received update from Kalpana that Lymphedema OT will be starting with member on 7/6 at [facility] for treatment/support with edema therapy. Kalpana will update the daughter. CC to update PCP.  Yue Goss RN  [facility]  P: [phone]  Fax: [phone]    
Yes
Yes
cp

## 2024-02-02 NOTE — ASU DISCHARGE PLAN (ADULT/PEDIATRIC) - CALL YOUR DOCTOR IF YOU HAVE ANY OF THE FOLLOWING:
Fever greater than (need to indicate Fahrenheit or Celsius)/Bleeding that does not stop/Pain not relieved by Medications
FAMILY HISTORY:  Father  Still living? Yes, Estimated age: Age Unknown  Family history of diabetes mellitus (DM), Age at diagnosis: Age Unknown  FH: heart disease, Age at diagnosis: Age Unknown

## 2024-02-08 NOTE — ASU PREOP CHECKLIST - INTERNAL PROSTHESES
RN called patient. RN left message again asking patient to call back so that he can get scheduled with benign hematologist. Call back number reviewed. RN also called patient's spouse and left a message asking her to call back regarding getting patient scheduled. Call back instructions reviewed.  
no
